# Patient Record
Sex: FEMALE | Race: WHITE | NOT HISPANIC OR LATINO | Employment: OTHER | URBAN - NONMETROPOLITAN AREA
[De-identification: names, ages, dates, MRNs, and addresses within clinical notes are randomized per-mention and may not be internally consistent; named-entity substitution may affect disease eponyms.]

---

## 2018-10-22 ENCOUNTER — HISTORICAL (OUTPATIENT)
Dept: ADMINISTRATIVE | Facility: HOSPITAL | Age: 83
End: 2018-10-22

## 2018-10-26 ENCOUNTER — HISTORICAL (OUTPATIENT)
Dept: ADMINISTRATIVE | Facility: HOSPITAL | Age: 83
End: 2018-10-26

## 2018-10-30 ENCOUNTER — HISTORICAL (OUTPATIENT)
Dept: ADMINISTRATIVE | Facility: HOSPITAL | Age: 83
End: 2018-10-30

## 2022-08-02 ENCOUNTER — HISTORICAL (OUTPATIENT)
Dept: ADMINISTRATIVE | Facility: HOSPITAL | Age: 87
End: 2022-08-02

## 2023-07-16 ENCOUNTER — HOSPITAL ENCOUNTER (EMERGENCY)
Facility: HOSPITAL | Age: 88
Discharge: HOME | End: 2023-07-16
Payer: MEDICARE

## 2023-07-16 VITALS
SYSTOLIC BLOOD PRESSURE: 137 MMHG | BODY MASS INDEX: 17.72 KG/M2 | TEMPERATURE: 98 F | OXYGEN SATURATION: 95 % | WEIGHT: 100 LBS | RESPIRATION RATE: 18 BRPM | DIASTOLIC BLOOD PRESSURE: 70 MMHG | HEART RATE: 70 BPM | HEIGHT: 63 IN

## 2023-07-16 DIAGNOSIS — L03.115 CELLULITIS OF RIGHT LEG: Primary | ICD-10-CM

## 2023-07-16 LAB
ALBUMIN SERPL-MCNC: 3.9 G/DL (ref 3.4–5)
ALBUMIN/GLOB SERPL: 1.9 RATIO
ALP SERPL-CCNC: 68 UNIT/L (ref 50–144)
ALT SERPL-CCNC: 14 UNIT/L (ref 1–45)
ANION GAP SERPL CALC-SCNC: 1 MEQ/L (ref 2–13)
AST SERPL-CCNC: 20 UNIT/L (ref 14–36)
BASOPHILS # BLD AUTO: 0.04 X10(3)/MCL (ref 0.01–0.08)
BASOPHILS NFR BLD AUTO: 0.7 % (ref 0.1–1.2)
BILIRUBIN DIRECT+TOT PNL SERPL-MCNC: 0.5 MG/DL (ref 0–1)
BUN SERPL-MCNC: 16 MG/DL (ref 7–20)
CALCIUM SERPL-MCNC: 9.5 MG/DL (ref 8.4–10.2)
CHLORIDE SERPL-SCNC: 105 MMOL/L (ref 98–110)
CO2 SERPL-SCNC: 35 MMOL/L (ref 21–32)
CREAT SERPL-MCNC: 0.71 MG/DL (ref 0.66–1.25)
CREAT/UREA NIT SERPL: 23 (ref 12–20)
EOSINOPHIL # BLD AUTO: 0.06 X10(3)/MCL (ref 0.04–0.36)
EOSINOPHIL NFR BLD AUTO: 1.1 % (ref 0.7–7)
ERYTHROCYTE [DISTWIDTH] IN BLOOD BY AUTOMATED COUNT: 15.1 % (ref 11–14.5)
GFR SERPLBLD CREATININE-BSD FMLA CKD-EPI: 81 MLS/MIN/1.73/M2
GLOBULIN SER-MCNC: 2.1 GM/DL (ref 2–3.9)
GLUCOSE SERPL-MCNC: 97 MG/DL (ref 70–115)
HCT VFR BLD AUTO: 32.5 % (ref 36–48)
HGB BLD-MCNC: 10.3 G/DL (ref 11.8–16)
IMM GRANULOCYTES # BLD AUTO: 0.02 X10(3)/MCL (ref 0–0.03)
IMM GRANULOCYTES NFR BLD AUTO: 0.4 % (ref 0–0.5)
LYMPHOCYTES # BLD AUTO: 0.79 X10(3)/MCL (ref 1.16–3.74)
LYMPHOCYTES NFR BLD AUTO: 14.2 % (ref 20–55)
MCH RBC QN AUTO: 30.1 PG (ref 27–34)
MCHC RBC AUTO-ENTMCNC: 31.7 G/DL (ref 31–37)
MCV RBC AUTO: 95 FL (ref 79–99)
MONOCYTES # BLD AUTO: 0.38 X10(3)/MCL (ref 0.24–0.36)
MONOCYTES NFR BLD AUTO: 6.8 % (ref 4.7–12.5)
NEUTROPHILS # BLD AUTO: 4.29 X10(3)/MCL (ref 1.56–6.13)
NEUTROPHILS NFR BLD AUTO: 76.8 % (ref 37–73)
NRBC BLD AUTO-RTO: 0 %
PLATELET # BLD AUTO: 400 X10(3)/MCL (ref 140–371)
PMV BLD AUTO: 8.7 FL (ref 9.4–12.4)
POTASSIUM SERPL-SCNC: 4.3 MMOL/L (ref 3.5–5.1)
PROT SERPL-MCNC: 6 GM/DL (ref 6.3–8.2)
RBC # BLD AUTO: 3.42 X10(6)/MCL (ref 4–5.1)
SODIUM SERPL-SCNC: 141 MMOL/L (ref 135–145)
WBC # SPEC AUTO: 5.58 X10(3)/MCL (ref 4–11.5)

## 2023-07-16 PROCEDURE — 36415 COLL VENOUS BLD VENIPUNCTURE: CPT | Performed by: NURSE PRACTITIONER

## 2023-07-16 PROCEDURE — 85025 COMPLETE CBC W/AUTO DIFF WBC: CPT | Performed by: NURSE PRACTITIONER

## 2023-07-16 PROCEDURE — 99283 EMERGENCY DEPT VISIT LOW MDM: CPT

## 2023-07-16 PROCEDURE — 80053 COMPREHEN METABOLIC PANEL: CPT | Performed by: NURSE PRACTITIONER

## 2023-07-16 RX ORDER — CEPHALEXIN 500 MG/1
500 CAPSULE ORAL 4 TIMES DAILY
Qty: 20 CAPSULE | Refills: 0 | Status: SHIPPED | OUTPATIENT
Start: 2023-07-16 | End: 2023-07-21

## 2023-07-16 NOTE — ED PROVIDER NOTES
Encounter Date: 7/16/2023       History     Chief Complaint   Patient presents with    Cellulitis     Pt c/o redness and swelling to right foot/lower leg since yesterday but worse today.       C/O Pt c/o redness and swelling to right foot/lower leg since yesterday but worse today.        Review of patient's allergies indicates:  No Known Allergies  Past Medical History:   Diagnosis Date    COPD (chronic obstructive pulmonary disease)     Femur fracture, right     Paroxysmal atrial fibrillation      Past Surgical History:   Procedure Laterality Date    APPENDECTOMY      CHOLECYSTECTOMY      CORONARY ANGIOPLASTY WITH STENT PLACEMENT      HIATAL HERNIA REPAIR      HYSTERECTOMY      MASTECTOMY Bilateral      No family history on file.  Social History     Tobacco Use    Smoking status: Every Day     Types: Cigarettes    Smokeless tobacco: Never   Substance Use Topics    Alcohol use: Never    Drug use: Never     Review of Systems   Skin:         RT LOWER LEG AND ANKLE REDNESS AND SWELLING   All other systems reviewed and are negative.    Physical Exam     Initial Vitals   BP Pulse Resp Temp SpO2   07/16/23 1505 07/16/23 1505 07/16/23 1505 07/16/23 1519 07/16/23 1505   137/70 70 18 98.2 °F (36.8 °C) 95 %      MAP       --                Physical Exam    Nursing note and vitals reviewed.  Constitutional: She appears well-developed and well-nourished.   HENT:   Head: Normocephalic and atraumatic.   Eyes: EOM are normal. Pupils are equal, round, and reactive to light.   Neck: Neck supple.   Normal range of motion.  Cardiovascular:  Normal rate, regular rhythm and normal heart sounds.           Pulmonary/Chest: Breath sounds normal.   Abdominal: Abdomen is soft. Bowel sounds are normal.   Musculoskeletal:         General: Normal range of motion.      Cervical back: Normal range of motion and neck supple.     Neurological: She is alert and oriented to person, place, and time.   Skin: Skin is warm and dry. Capillary refill takes  less than 2 seconds.   RT LOWER LEG ERYTHEMA, WARMTH  AND SWELLING   Psychiatric: She has a normal mood and affect. Her behavior is normal. Judgment and thought content normal.       ED Course   Procedures  Labs Reviewed   COMPREHENSIVE METABOLIC PANEL - Abnormal; Notable for the following components:       Result Value    Carbon Dioxide 35 (*)     Protein Total 6.0 (*)     Anion Gap 1.0 (*)     BUN/Creatinine Ratio 23 (*)     All other components within normal limits   CBC WITH DIFFERENTIAL - Abnormal; Notable for the following components:    RBC 3.42 (*)     Hgb 10.3 (*)     Hct 32.5 (*)     RDW 15.1 (*)     Platelet 400 (*)     MPV 8.7 (*)     Neut % 76.8 (*)     Lymph % 14.2 (*)     Lymph # 0.79 (*)     Mono # 0.38 (*)     All other components within normal limits   CBC W/ AUTO DIFFERENTIAL    Narrative:     The following orders were created for panel order CBC auto differential.  Procedure                               Abnormality         Status                     ---------                               -----------         ------                     CBC with Differential[[id number]]        Abnormal            Final result                 Please view results for these tests on the individual orders.          Imaging Results    None          Medications - No data to display  Medical Decision Making:   Initial Assessment:   Rt lower leg redness, warmth and swelling, rhonchi throughout lung fields r/t chronic copd, otherwise negative exam  Differential Diagnosis:   Allergic reaction, insect bites  Clinical Tests:   Lab Tests: Ordered and Reviewed  The following lab test(s) were unremarkable: CBC and CMP  ED Management:  Prescriptions provided at discharge           ED Course as of 07/16/23 1607   Sun Jul 16, 2023   1540 Comprehensive metabolic panel []      ED Course User Index  [] JOSE LUIS Correa                 Clinical Impression:   Final diagnoses:  [L03.115] Cellulitis of right leg (Primary)        ED  Disposition Condition    Discharge Stable          ED Prescriptions       Medication Sig Dispense Start Date End Date Auth. Provider    cephALEXin (KEFLEX) 500 MG capsule Take 1 capsule (500 mg total) by mouth 4 (four) times daily. for 5 days 20 capsule 7/16/2023 7/21/2023 JOSE LUIS Correa          Follow-up Information       Follow up With Specialties Details Why Contact Info    Sj Paige MD Internal Medicine  As needed [address] [facility] [id number] [phone]               JOSE LUIS Correa  07/16/23 8256

## 2023-10-17 ENCOUNTER — TELEPHONE (OUTPATIENT)
Dept: FAMILY MEDICINE | Facility: CLINIC | Age: 88
End: 2023-10-17
Payer: MEDICARE

## 2024-09-05 ENCOUNTER — LAB REQUISITION (OUTPATIENT)
Dept: LAB | Facility: HOSPITAL | Age: 89
End: 2024-09-05
Payer: MEDICARE

## 2024-09-05 DIAGNOSIS — I50.9 HEART FAILURE, UNSPECIFIED: ICD-10-CM

## 2024-09-05 DIAGNOSIS — I25.10 ATHEROSCLEROTIC HEART DISEASE OF NATIVE CORONARY ARTERY WITHOUT ANGINA PECTORIS: ICD-10-CM

## 2024-09-05 DIAGNOSIS — E55.9 VITAMIN D DEFICIENCY, UNSPECIFIED: ICD-10-CM

## 2024-09-05 DIAGNOSIS — E11.9 TYPE 2 DIABETES MELLITUS WITHOUT COMPLICATIONS: ICD-10-CM

## 2024-09-05 DIAGNOSIS — E78.5 HYPERLIPIDEMIA, UNSPECIFIED: ICD-10-CM

## 2024-09-05 DIAGNOSIS — I11.0 HYPERTENSIVE HEART DISEASE WITH HEART FAILURE: ICD-10-CM

## 2024-09-05 DIAGNOSIS — E03.9 HYPOTHYROIDISM, UNSPECIFIED: ICD-10-CM

## 2024-09-05 LAB
ALBUMIN SERPL-MCNC: 4 G/DL (ref 3.4–5)
ALBUMIN/GLOB SERPL: 1.7 RATIO
ALP SERPL-CCNC: 70 UNIT/L (ref 50–144)
ALT SERPL-CCNC: 12 UNIT/L (ref 1–45)
ANION GAP SERPL CALC-SCNC: 5 MEQ/L (ref 2–13)
AST SERPL-CCNC: 19 UNIT/L (ref 14–36)
BASOPHILS # BLD AUTO: 0.03 X10(3)/MCL (ref 0.01–0.08)
BASOPHILS NFR BLD AUTO: 0.5 % (ref 0.1–1.2)
BILIRUB SERPL-MCNC: 0.3 MG/DL (ref 0–1)
BILIRUB UR QL STRIP.AUTO: NEGATIVE
BUN SERPL-MCNC: 30 MG/DL (ref 7–20)
CALCIUM SERPL-MCNC: 10.4 MG/DL (ref 8.4–10.2)
CHLORIDE SERPL-SCNC: 102 MMOL/L (ref 98–110)
CHOLEST SERPL-MCNC: 141 MG/DL (ref 0–200)
CLARITY UR: CLEAR
CO2 SERPL-SCNC: 29 MMOL/L (ref 21–32)
COLOR UR AUTO: YELLOW
CREAT SERPL-MCNC: 0.65 MG/DL (ref 0.66–1.25)
CREAT/UREA NIT SERPL: 46 (ref 12–20)
EOSINOPHIL # BLD AUTO: 0.06 X10(3)/MCL (ref 0.04–0.36)
EOSINOPHIL NFR BLD AUTO: 1.1 % (ref 0.7–7)
ERYTHROCYTE [DISTWIDTH] IN BLOOD BY AUTOMATED COUNT: 13.9 % (ref 11–14.5)
GFR SERPLBLD CREATININE-BSD FMLA CKD-EPI: 84 ML/MIN/1.73/M2
GLOBULIN SER-MCNC: 2.3 GM/DL (ref 2–3.9)
GLUCOSE SERPL-MCNC: 82 MG/DL (ref 70–115)
GLUCOSE UR QL STRIP: NEGATIVE
HCT VFR BLD AUTO: 36.5 % (ref 36–48)
HDLC SERPL-MCNC: 62 MG/DL (ref 40–60)
HGB BLD-MCNC: 11.5 G/DL (ref 11.8–16)
HGB UR QL STRIP: NEGATIVE
IMM GRANULOCYTES # BLD AUTO: 0.01 X10(3)/MCL (ref 0–0.03)
IMM GRANULOCYTES NFR BLD AUTO: 0.2 % (ref 0–0.5)
KETONES UR QL STRIP: NEGATIVE
LDLC SERPL DIRECT ASSAY-SCNC: 66.3 MG/DL (ref 30–100)
LEUKOCYTE ESTERASE UR QL STRIP: NEGATIVE
LYMPHOCYTES # BLD AUTO: 0.72 X10(3)/MCL (ref 1.16–3.74)
LYMPHOCYTES NFR BLD AUTO: 13.2 % (ref 20–55)
MCH RBC QN AUTO: 27.3 PG (ref 27–34)
MCHC RBC AUTO-ENTMCNC: 31.5 G/DL (ref 31–37)
MCV RBC AUTO: 86.7 FL (ref 79–99)
MONOCYTES # BLD AUTO: 0.45 X10(3)/MCL (ref 0.24–0.36)
MONOCYTES NFR BLD AUTO: 8.2 % (ref 4.7–12.5)
NEUTROPHILS # BLD AUTO: 4.19 X10(3)/MCL (ref 1.56–6.13)
NEUTROPHILS NFR BLD AUTO: 76.8 % (ref 37–73)
NITRITE UR QL STRIP: NEGATIVE
NRBC BLD AUTO-RTO: 0 %
PH UR STRIP: 6 [PH]
PLATELET # BLD AUTO: 333 X10(3)/MCL (ref 140–371)
PMV BLD AUTO: 10 FL (ref 9.4–12.4)
POTASSIUM SERPL-SCNC: 4.3 MMOL/L (ref 3.5–5.1)
PROT SERPL-MCNC: 6.3 GM/DL (ref 6.3–8.2)
PROT UR QL STRIP: NEGATIVE
RBC # BLD AUTO: 4.21 X10(6)/MCL (ref 4–5.1)
SODIUM SERPL-SCNC: 136 MMOL/L (ref 136–145)
SP GR UR STRIP.AUTO: 1.02 (ref 1–1.03)
T4 FREE SERPL-MCNC: 1.23 NG/DL (ref 0.78–2.19)
TRIGL SERPL-MCNC: 64 MG/DL (ref 30–200)
UROBILINOGEN UR STRIP-ACNC: 0.2
WBC # BLD AUTO: 5.46 X10(3)/MCL (ref 4–11.5)

## 2024-09-05 PROCEDURE — 84439 ASSAY OF FREE THYROXINE: CPT | Performed by: INTERNAL MEDICINE

## 2024-09-05 PROCEDURE — 82652 VIT D 1 25-DIHYDROXY: CPT | Performed by: INTERNAL MEDICINE

## 2024-09-05 PROCEDURE — 80053 COMPREHEN METABOLIC PANEL: CPT | Performed by: INTERNAL MEDICINE

## 2024-09-05 PROCEDURE — 80061 LIPID PANEL: CPT | Performed by: INTERNAL MEDICINE

## 2024-09-05 PROCEDURE — 81003 URINALYSIS AUTO W/O SCOPE: CPT | Performed by: INTERNAL MEDICINE

## 2024-09-05 PROCEDURE — 85025 COMPLETE CBC W/AUTO DIFF WBC: CPT | Performed by: INTERNAL MEDICINE

## 2024-09-11 LAB — 1,25(OH)2D SERPL-MCNC: 29 PG/ML (ref 18–78)

## 2024-12-13 ENCOUNTER — HOSPITAL ENCOUNTER (OUTPATIENT)
Facility: HOSPITAL | Age: 89
Discharge: NURSING, REHAB OR LONG-TERM CARE | End: 2024-12-16
Attending: FAMILY MEDICINE | Admitting: FAMILY MEDICINE
Payer: MEDICARE

## 2024-12-13 DIAGNOSIS — M17.12 OSTEOARTHRITIS OF LEFT KNEE, UNSPECIFIED OSTEOARTHRITIS TYPE: Primary | ICD-10-CM

## 2024-12-13 DIAGNOSIS — Z74.09 IMPAIRED MOBILITY AND ADLS: ICD-10-CM

## 2024-12-13 DIAGNOSIS — I48.91 A-FIB: ICD-10-CM

## 2024-12-13 DIAGNOSIS — M25.50 JOINT PAIN: ICD-10-CM

## 2024-12-13 DIAGNOSIS — Z78.9 IMPAIRED MOBILITY AND ADLS: ICD-10-CM

## 2024-12-13 LAB
ALBUMIN SERPL-MCNC: 4 G/DL (ref 3.4–5)
ALBUMIN/GLOB SERPL: 2 RATIO
ALP SERPL-CCNC: 86 UNIT/L (ref 50–144)
ALT SERPL-CCNC: 9 UNIT/L (ref 1–45)
ANION GAP SERPL CALC-SCNC: 6 MEQ/L (ref 2–13)
AST SERPL-CCNC: 14 UNIT/L (ref 14–36)
BASOPHILS # BLD AUTO: 0.03 X10(3)/MCL (ref 0.01–0.08)
BASOPHILS NFR BLD AUTO: 0.4 % (ref 0.1–1.2)
BILIRUB SERPL-MCNC: 0.6 MG/DL (ref 0–1)
BUN SERPL-MCNC: 23 MG/DL (ref 7–20)
CALCIUM SERPL-MCNC: 10 MG/DL (ref 8.4–10.2)
CHLORIDE SERPL-SCNC: 102 MMOL/L (ref 98–110)
CO2 SERPL-SCNC: 30 MMOL/L (ref 21–32)
CREAT SERPL-MCNC: 0.62 MG/DL (ref 0.66–1.25)
CREAT/UREA NIT SERPL: 37 (ref 12–20)
EOSINOPHIL # BLD AUTO: 0.02 X10(3)/MCL (ref 0.04–0.36)
EOSINOPHIL NFR BLD AUTO: 0.2 % (ref 0.7–7)
ERYTHROCYTE [DISTWIDTH] IN BLOOD BY AUTOMATED COUNT: 13 % (ref 11–14.5)
ERYTHROCYTE [SEDIMENTATION RATE] IN BLOOD: 15 MM/HR (ref 0–20)
GFR SERPLBLD CREATININE-BSD FMLA CKD-EPI: 85 ML/MIN/1.73/M2
GLOBULIN SER-MCNC: 2 GM/DL (ref 2–3.9)
GLUCOSE SERPL-MCNC: 84 MG/DL (ref 70–115)
HCT VFR BLD AUTO: 35 % (ref 36–48)
HGB BLD-MCNC: 11.1 G/DL (ref 11.8–16)
IMM GRANULOCYTES # BLD AUTO: 0.02 X10(3)/MCL (ref 0–0.03)
IMM GRANULOCYTES NFR BLD AUTO: 0.2 % (ref 0–0.5)
LYMPHOCYTES # BLD AUTO: 0.5 X10(3)/MCL (ref 1.16–3.74)
LYMPHOCYTES NFR BLD AUTO: 6 % (ref 20–55)
MCH RBC QN AUTO: 28.1 PG (ref 27–34)
MCHC RBC AUTO-ENTMCNC: 31.7 G/DL (ref 31–37)
MCV RBC AUTO: 88.6 FL (ref 79–99)
MONOCYTES # BLD AUTO: 0.78 X10(3)/MCL (ref 0.24–0.36)
MONOCYTES NFR BLD AUTO: 9.4 % (ref 4.7–12.5)
NEUTROPHILS # BLD AUTO: 6.95 X10(3)/MCL (ref 1.56–6.13)
NEUTROPHILS NFR BLD AUTO: 83.8 % (ref 37–73)
NRBC BLD AUTO-RTO: 0 %
OHS QRS DURATION: 144 MS
OHS QTC CALCULATION: 454 MS
PLATELET # BLD AUTO: 313 X10(3)/MCL (ref 140–371)
PMV BLD AUTO: 9.5 FL (ref 9.4–12.4)
POTASSIUM SERPL-SCNC: 3.9 MMOL/L (ref 3.5–5.1)
PROT SERPL-MCNC: 6 GM/DL (ref 6.3–8.2)
RBC # BLD AUTO: 3.95 X10(6)/MCL (ref 4–5.1)
SODIUM SERPL-SCNC: 138 MMOL/L (ref 136–145)
WBC # BLD AUTO: 8.3 X10(3)/MCL (ref 4–11.5)

## 2024-12-13 PROCEDURE — 96372 THER/PROPH/DIAG INJ SC/IM: CPT | Performed by: FAMILY MEDICINE

## 2024-12-13 PROCEDURE — 94761 N-INVAS EAR/PLS OXIMETRY MLT: CPT

## 2024-12-13 PROCEDURE — 97162 PT EVAL MOD COMPLEX 30 MIN: CPT

## 2024-12-13 PROCEDURE — 85025 COMPLETE CBC W/AUTO DIFF WBC: CPT | Performed by: FAMILY MEDICINE

## 2024-12-13 PROCEDURE — 63600175 PHARM REV CODE 636 W HCPCS: Performed by: FAMILY MEDICINE

## 2024-12-13 PROCEDURE — 80053 COMPREHEN METABOLIC PANEL: CPT | Performed by: FAMILY MEDICINE

## 2024-12-13 PROCEDURE — 93005 ELECTROCARDIOGRAM TRACING: CPT

## 2024-12-13 PROCEDURE — G0378 HOSPITAL OBSERVATION PER HR: HCPCS

## 2024-12-13 PROCEDURE — 99285 EMERGENCY DEPT VISIT HI MDM: CPT | Mod: 25

## 2024-12-13 PROCEDURE — 85652 RBC SED RATE AUTOMATED: CPT | Performed by: FAMILY MEDICINE

## 2024-12-13 PROCEDURE — 93010 ELECTROCARDIOGRAM REPORT: CPT | Mod: ,,, | Performed by: INTERNAL MEDICINE

## 2024-12-13 PROCEDURE — 25000003 PHARM REV CODE 250: Performed by: FAMILY MEDICINE

## 2024-12-13 RX ORDER — POTASSIUM CHLORIDE 20 MEQ/1
20 TABLET, EXTENDED RELEASE ORAL DAILY
Status: DISCONTINUED | OUTPATIENT
Start: 2024-12-13 | End: 2024-12-16 | Stop reason: HOSPADM

## 2024-12-13 RX ORDER — FLECAINIDE ACETATE 50 MG/1
50 TABLET ORAL EVERY 12 HOURS
COMMUNITY

## 2024-12-13 RX ORDER — ISOSORBIDE MONONITRATE 30 MG/1
30 TABLET, EXTENDED RELEASE ORAL DAILY
Status: DISCONTINUED | OUTPATIENT
Start: 2024-12-13 | End: 2024-12-16 | Stop reason: HOSPADM

## 2024-12-13 RX ORDER — POTASSIUM CHLORIDE 20 MEQ/1
20 TABLET, EXTENDED RELEASE ORAL DAILY
COMMUNITY

## 2024-12-13 RX ORDER — CLOPIDOGREL BISULFATE 75 MG/1
75 TABLET ORAL DAILY
Status: DISCONTINUED | OUTPATIENT
Start: 2024-12-13 | End: 2024-12-16 | Stop reason: HOSPADM

## 2024-12-13 RX ORDER — ASPIRIN 81 MG/1
81 TABLET ORAL DAILY
COMMUNITY

## 2024-12-13 RX ORDER — HYDROCODONE BITARTRATE AND ACETAMINOPHEN 7.5; 325 MG/1; MG/1
1 TABLET ORAL EVERY 6 HOURS PRN
Status: DISCONTINUED | OUTPATIENT
Start: 2024-12-13 | End: 2024-12-16 | Stop reason: HOSPADM

## 2024-12-13 RX ORDER — PANTOPRAZOLE SODIUM 40 MG/1
40 TABLET, DELAYED RELEASE ORAL DAILY
COMMUNITY

## 2024-12-13 RX ORDER — FUROSEMIDE 20 MG/1
20 TABLET ORAL 2 TIMES DAILY
COMMUNITY

## 2024-12-13 RX ORDER — CLOPIDOGREL BISULFATE 75 MG/1
75 TABLET ORAL DAILY
COMMUNITY

## 2024-12-13 RX ORDER — DEXAMETHASONE SODIUM PHOSPHATE 4 MG/ML
8 INJECTION, SOLUTION INTRA-ARTICULAR; INTRALESIONAL; INTRAMUSCULAR; INTRAVENOUS; SOFT TISSUE
Status: COMPLETED | OUTPATIENT
Start: 2024-12-13 | End: 2024-12-13

## 2024-12-13 RX ORDER — MORPHINE SULFATE 4 MG/ML
3 INJECTION, SOLUTION INTRAMUSCULAR; INTRAVENOUS EVERY 6 HOURS PRN
Status: DISCONTINUED | OUTPATIENT
Start: 2024-12-13 | End: 2024-12-16 | Stop reason: HOSPADM

## 2024-12-13 RX ORDER — ONDANSETRON HYDROCHLORIDE 2 MG/ML
4 INJECTION, SOLUTION INTRAVENOUS EVERY 6 HOURS PRN
Status: DISCONTINUED | OUTPATIENT
Start: 2024-12-13 | End: 2024-12-16 | Stop reason: HOSPADM

## 2024-12-13 RX ORDER — ASPIRIN 81 MG/1
81 TABLET ORAL DAILY
Status: DISCONTINUED | OUTPATIENT
Start: 2024-12-13 | End: 2024-12-16 | Stop reason: HOSPADM

## 2024-12-13 RX ORDER — ISOSORBIDE MONONITRATE 30 MG/1
30 TABLET, EXTENDED RELEASE ORAL DAILY
COMMUNITY

## 2024-12-13 RX ORDER — TALC
6 POWDER (GRAM) TOPICAL NIGHTLY PRN
Status: DISCONTINUED | OUTPATIENT
Start: 2024-12-13 | End: 2024-12-16 | Stop reason: HOSPADM

## 2024-12-13 RX ORDER — ENOXAPARIN SODIUM 100 MG/ML
40 INJECTION SUBCUTANEOUS EVERY 24 HOURS
Status: DISCONTINUED | OUTPATIENT
Start: 2024-12-13 | End: 2024-12-15

## 2024-12-13 RX ORDER — PANTOPRAZOLE SODIUM 40 MG/1
40 TABLET, DELAYED RELEASE ORAL DAILY
Status: DISCONTINUED | OUTPATIENT
Start: 2024-12-13 | End: 2024-12-16 | Stop reason: HOSPADM

## 2024-12-13 RX ORDER — SODIUM CHLORIDE 0.9 % (FLUSH) 0.9 %
3 SYRINGE (ML) INJECTION EVERY 6 HOURS PRN
Status: DISCONTINUED | OUTPATIENT
Start: 2024-12-13 | End: 2024-12-16 | Stop reason: HOSPADM

## 2024-12-13 RX ORDER — FUROSEMIDE 20 MG/1
20 TABLET ORAL 2 TIMES DAILY
Status: DISCONTINUED | OUTPATIENT
Start: 2024-12-13 | End: 2024-12-16 | Stop reason: HOSPADM

## 2024-12-13 RX ORDER — KETOROLAC TROMETHAMINE 30 MG/ML
60 INJECTION, SOLUTION INTRAMUSCULAR; INTRAVENOUS
Status: COMPLETED | OUTPATIENT
Start: 2024-12-13 | End: 2024-12-13

## 2024-12-13 RX ORDER — FLECAINIDE ACETATE 50 MG/1
50 TABLET ORAL EVERY 12 HOURS
Status: DISCONTINUED | OUTPATIENT
Start: 2024-12-13 | End: 2024-12-16 | Stop reason: HOSPADM

## 2024-12-13 RX ORDER — ACETAMINOPHEN 500 MG
1000 TABLET ORAL EVERY 6 HOURS PRN
Status: DISCONTINUED | OUTPATIENT
Start: 2024-12-13 | End: 2024-12-16 | Stop reason: HOSPADM

## 2024-12-13 RX ADMIN — PANTOPRAZOLE SODIUM 40 MG: 40 TABLET, DELAYED RELEASE ORAL at 01:12

## 2024-12-13 RX ADMIN — ENOXAPARIN SODIUM 40 MG: 40 INJECTION SUBCUTANEOUS at 05:12

## 2024-12-13 RX ADMIN — FLECAINIDE ACETATE 50 MG: 50 TABLET ORAL at 08:12

## 2024-12-13 RX ADMIN — HYDROCODONE BITARTRATE AND ACETAMINOPHEN 1 TABLET: 7.5; 325 TABLET ORAL at 08:12

## 2024-12-13 RX ADMIN — ASPIRIN 81 MG: 81 TABLET, COATED ORAL at 01:12

## 2024-12-13 RX ADMIN — KETOROLAC TROMETHAMINE 60 MG: 30 INJECTION, SOLUTION INTRAMUSCULAR at 06:12

## 2024-12-13 RX ADMIN — DEXAMETHASONE SODIUM PHOSPHATE 8 MG: 4 INJECTION, SOLUTION INTRA-ARTICULAR; INTRALESIONAL; INTRAMUSCULAR; INTRAVENOUS; SOFT TISSUE at 06:12

## 2024-12-13 RX ADMIN — POTASSIUM CHLORIDE 20 MEQ: 1500 TABLET, EXTENDED RELEASE ORAL at 01:12

## 2024-12-13 RX ADMIN — FUROSEMIDE 20 MG: 20 TABLET ORAL at 08:12

## 2024-12-13 RX ADMIN — FLECAINIDE ACETATE 50 MG: 50 TABLET ORAL at 01:12

## 2024-12-13 RX ADMIN — FUROSEMIDE 20 MG: 20 TABLET ORAL at 01:12

## 2024-12-13 RX ADMIN — ISOSORBIDE MONONITRATE 30 MG: 30 TABLET, EXTENDED RELEASE ORAL at 01:12

## 2024-12-13 RX ADMIN — CLOPIDOGREL BISULFATE 75 MG: 75 TABLET ORAL at 01:12

## 2024-12-13 NOTE — PT/OT/SLP EVAL
Physical Therapy Evaluation    Patient Name:  Stephanie Gutierrez   MRN:  [id number]    Recommendations:     Discharge Recommendations:  (Rehab vs Home Health PT, pending medical progress)   Discharge Equipment Recommendations:     Barriers to discharge:  current medical status    Assessment:     Stephanie Gutierrez is a 89 y.o. female admitted with a medical diagnosis of <principal problem not specified>.  She presents with the following impairments/functional limitations: pain, gait instability, impaired functional mobility, weakness     Patient found with HOB elevated. She is agreeable to PT evaluation. Patient performed supine to sit, min A. Sit to stand completed, min A with SW. Patient maintained standing balance for approximately 1 minute before needing to sit down d/t fatigue. Following seated rest break, patient completed a second sit to stand, min A with RW, and completed 4 steps to left side. Patient returned to bed and left with HOB elevated and all needs met. Cryotherapy applied to L knee to reduce edema and pain. Nurse notified.    Rehab Prognosis: Good; patient would benefit from acute skilled PT services to address these deficits and reach maximum level of function.    Recent Surgery: * No surgery found *      Plan:     During this hospitalization, patient to be seen 5 x/week (5-6x/week, 1-2x/day) to address the identified rehab impairments via gait training, therapeutic activities, therapeutic exercises and progress toward the following goals:    Plan of Care Expires:  01/13/25    Subjective     Chief Complaint: L knee pain  Patient/Family Comments/goals: to go home  Pain/Comfort:       Patients cultural, spiritual, Sabianist conflicts given the current situation:      Living Environment:  Home alone  Prior to admission, patients level of function was independent with AD.  Equipment used at home: walker, standard, nebulizer.  DME owned (not currently used): none.  Upon discharge, patient will have assistance  from family.    Objective:     Communicated with nursing prior to session.  Patient found HOB elevated with bed alarm, telemetry, peripheral IV, PureWick, knee immobilizer  upon PT entry to room.    General Precautions: Standard, fall  Orthopedic Precautions:    Braces:    Respiratory Status: Room air    Exams:  RLE ROM: WFL  RLE Strength: WFL  LLE ROM: limited d/t pain  LLE Strength: 3+/5 d/t pain    Functional Mobility:  Bed Mobility:     Supine to Sit: minimum assistance  Sit to Supine: minimum assistance  Transfers:     Sit to Stand:  minimum assistance with standard walker      AM-PAC 6 CLICK MOBILITY  Total Score:        Treatment & Education:  See above    Patient left HOB elevated with all lines intact, call button in reach, bed alarm on, nurse notified, and family present.    GOALS:   Multidisciplinary Problems       Physical Therapy Goals          Problem: Physical Therapy    Goal Priority Disciplines Outcome Interventions   Physical Therapy Goal     PT, PT/OT Progressing    Description: Goals to be met by: discharge     Patient will increase functional independence with mobility by performin. Sit to stand transfer with Contact Guard Assistance  2. Bed to chair transfer with Contact Guard Assistance using Standard Walker  3. Gait  x 15 feet with Contact Guard Assistance using Standard Walker.                          History:     Past Medical History:   Diagnosis Date    COPD (chronic obstructive pulmonary disease)     Femur fracture, right     Paroxysmal atrial fibrillation        Past Surgical History:   Procedure Laterality Date    APPENDECTOMY      CHOLECYSTECTOMY      CORONARY ANGIOPLASTY WITH STENT PLACEMENT      HIATAL HERNIA REPAIR      HYSTERECTOMY      MASTECTOMY Bilateral        Time Tracking:     PT Received On: 24  PT Start Time: 1410     PT Stop Time: 1440  PT Total Time (min): 30 min     Billable Minutes: Evaluation 30      2024

## 2024-12-13 NOTE — ED PROVIDER NOTES
Encounter Date: 12/13/2024       History     Chief Complaint   Patient presents with    Knee Pain     Arrives via EMS [facility] from home with c/o (L) knee pain and swelling onset x couple of days.      Patient presents with atraumatic left knee pain and swelling.  No calf pain or swelling.  Onset times couple of days.  No fevers chills sweats.  She lives alone.  She states that the pain is such that she can no longer self care for herself.  She can not bathe toilet transfer.  She has no support instructed that she can utilize at the house either with the family or other resources.        Review of patient's allergies indicates:  No Known Allergies  Past Medical History:   Diagnosis Date    COPD (chronic obstructive pulmonary disease)     Femur fracture, right     Paroxysmal atrial fibrillation      Past Surgical History:   Procedure Laterality Date    APPENDECTOMY      CHOLECYSTECTOMY      CORONARY ANGIOPLASTY WITH STENT PLACEMENT      HIATAL HERNIA REPAIR      HYSTERECTOMY      MASTECTOMY Bilateral      No family history on file.  Social History     Tobacco Use    Smoking status: Every Day     Types: Cigarettes    Smokeless tobacco: Never   Substance Use Topics    Alcohol use: Never    Drug use: Never     Review of Systems   Constitutional: Negative.    Respiratory: Negative.     Cardiovascular: Negative.    Musculoskeletal:  Positive for arthralgias, gait problem and joint swelling.       Physical Exam     Initial Vitals [12/13/24 0510]   BP Pulse Resp Temp SpO2   (!) 148/52 68 18 98.3 °F (36.8 °C) 98 %      MAP       --         Physical Exam    Constitutional: She appears well-developed and well-nourished.   Cardiovascular:  Normal rate, regular rhythm and normal heart sounds.           Pulmonary/Chest: Breath sounds normal.   Abdominal: Abdomen is soft. Bowel sounds are normal.   Musculoskeletal:      Comments: Left knee with moderate to severe swelling, increased warmth, no overlying erythema or fluctuance.      Skin: Skin is warm and dry.         ED Course   Procedures  Labs Reviewed   COMPREHENSIVE METABOLIC PANEL - Abnormal       Result Value    Sodium 138      Potassium 3.9      Chloride 102      CO2 30      Glucose 84      Blood Urea Nitrogen 23 (*)     Creatinine 0.62 (*)     Calcium 10.0      Protein Total 6.0 (*)     Albumin 4.0      Globulin 2.0      Albumin/Globulin Ratio 2.0      Bilirubin Total 0.6      ALP 86      ALT 9      AST 14      eGFR 85      Anion Gap 6.0      BUN/Creatinine Ratio 37 (*)    CBC WITH DIFFERENTIAL - Abnormal    WBC 8.30      RBC 3.95 (*)     Hgb 11.1 (*)     Hct 35.0 (*)     MCV 88.6      MCH 28.1      MCHC 31.7      RDW 13.0      Platelet 313      MPV 9.5      Neut % 83.8 (*)     Lymph % 6.0 (*)     Mono % 9.4      Eos % 0.2 (*)     Basophil % 0.4      Lymph # 0.50 (*)     Neut # 6.95 (*)     Mono # 0.78 (*)     Eos # 0.02 (*)     Baso # 0.03      IG# 0.02      IG% 0.2      NRBC% 0.0     SEDIMENTATION RATE, AUTOMATED - Normal    Sed Rate 15     CBC W/ AUTO DIFFERENTIAL    Narrative:     The following orders were created for panel order CBC auto differential.  Procedure                               Abnormality         Status                     ---------                               -----------         ------                     CBC with Differential[[id number]]        Abnormal            Final result                 Please view results for these tests on the individual orders.     EKG Readings: (Independently Interpreted)   Initial Reading: No STEMI. Rhythm: Normal Sinus Rhythm. Heart Rate: 67. ST Segments: Normal ST Segments. T Waves: Normal. Axis: Right Axis Deviation.       Imaging Results              X-Ray Chest AP Portable (Final result)  Result time 12/13/24 08:16:40      Final result by Saqib Richards MD (12/13/24 08:16:40)                   Impression:      Hyperinflation and cardiomegaly without acute cardiopulmonary abnormality.      Electronically signed by: Saqib Richards  MD  Date:    12/13/2024  Time:    08:16               Narrative:    EXAMINATION:  Single view chest radiograph.    CLINICAL HISTORY:  Unspecified atrial fibrillation    TECHNIQUE:  Single view of the chest.    COMPARISON:  Chest radiograph 08/02/2022.    FINDINGS:  The lungs are hyperinflated without consolidation or effusion.  There is no pneumothorax.  The cardiac silhouette is enlarged.  There is no acute osseous abnormality.                                       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  No displaced fractures identified.      Electronically signed by: Saqib Richards MD  Date:    12/13/2024  Time:    06:50               Narrative:    EXAMINATION:  Three radiographic views of the LEFT KNEE.    CLINICAL HISTORY:  Pain in unspecified joint    TECHNIQUE:  Three radiographic views of the LEFT KNEE.    COMPARISON:  None.    FINDINGS:  Three views of the left knee demonstrate severe tricompartmental osteoarthritis.  No displaced fracture is identified.  There is a large joint effusion.  There is no bony mass lesion.  There is no soft tissue swelling.  There is extensive atherosclerotic calcification.                                       Medications   HYDROcodone-acetaminophen 7.5-325 mg per tablet 1 tablet (1 tablet Oral Given 12/13/24 0813)   ketorolac injection 60 mg (60 mg Intramuscular Given 12/13/24 0607)   dexAMETHasone injection 8 mg (8 mg Intramuscular Given 12/13/24 0608)     Medical Decision Making  Amount and/or Complexity of Data Reviewed  Labs: ordered.  Radiology: ordered.  Discussion of management or test interpretation with external provider(s): Consulted with Dr. Jolley, the patient's orthopedist.  The plan is to admit her to the hospital secondary to her functional incapacitation.  She would then be  transferred to the rehab unit.    Risk  Prescription drug management.      Additional MDM:   Differential Diagnosis:   Osteoarthritis, crystal arthropathy, septic arthritis                                    Clinical Impression:  Final diagnoses:  [M25.50] Joint pain  [I48.91] A-fib  [M17.12] Osteoarthritis of left knee, unspecified osteoarthritis type (Primary)  [Z74.09, Z78.9] Impaired mobility and ADLs          ED Disposition Condition    Observation Stable                Fer Elaine MD  12/13/24 0958       Fer Elaine MD  12/13/24 1009

## 2024-12-13 NOTE — PLAN OF CARE
Problem: Physical Therapy  Goal: Physical Therapy Goal  Description: Goals to be met by: discharge     Patient will increase functional independence with mobility by performin. Sit to stand transfer with Contact Guard Assistance  2. Bed to chair transfer with Contact Guard Assistance using Standard Walker  3. Gait  x 15 feet with Contact Guard Assistance using Standard Walker.     Outcome: Progressing

## 2024-12-13 NOTE — H&P
Hospital Medicine  History & Physical Examination       Patient: Stephanie Gutierrez  MRN: [id number]  STATUS: OP- Observation   DOS: 12/13/2024   PCP: Sj Paige MD      CC: L knee pain and swelling        HISTORY OF PRESENT ILLNESS     89 yo F with atraumatic left knee pain and swelling. No calf pain or swelling. Onset was 2 days ago No fevers chills sweats. She lives alone. She states that the pain is such that she can no longer self care for herself. She can not bathe toilet transfer. Nobody at home to assist her. Xray L knee confirmed severe OA, large effusion. Ortho consulted and will see patient in consult.     REVIEW OF SYSTEMS     Except as documented, all other systems reviewed and negative       PAST MEDICAL HISTORY     Past Medical History:   Diagnosis Date    COPD (chronic obstructive pulmonary disease)     Femur fracture, right     Paroxysmal atrial fibrillation           PAST SURGICAL HISTORY     Past Surgical History:   Procedure Laterality Date    APPENDECTOMY      CHOLECYSTECTOMY      CORONARY ANGIOPLASTY WITH STENT PLACEMENT      HIATAL HERNIA REPAIR      HYSTERECTOMY      MASTECTOMY Bilateral           FAMILY HISTORY     Reviewed, negative in relation to patient's current condition.      SOCIAL HISTORY     Denies alcohol, tobacco or drug abuse    Social History     Tobacco Use    Smoking status: Every Day     Types: Cigarettes    Smokeless tobacco: Never   Substance Use Topics    Alcohol use: Never          ALLERGIES     Patient has no known allergies.      HOME MEDICATIONS     Current Outpatient Medications   Medication Instructions    aspirin (ECOTRIN) 81 mg, Daily    clopidogreL (PLAVIX) 75 mg, Daily    flecainide (TAMBOCOR) 50 mg, Every 12 hours    furosemide (LASIX) 20 mg, 2 times daily    iron,carb/vit C/vit B12/folic (IRON 100 PLUS ORAL) 1 tablet, Daily    isosorbide mononitrate (IMDUR) 30 mg, Daily    pantoprazole (PROTONIX) 40 mg, Daily    potassium chloride SA (K-DUR,KLOR-CON) 20 MEQ  "tablet 20 mEq, Daily    theophylline (CAROLINE-24) 100 mg, Daily          PHYSICAL EXAM   VITALS: T 98 °F (36.7 °C)   BP (!) 146/70   P 64   RR 16   O2 (!) 93 %    GENERAL: Awake and in NAD  HEENT: NC/AT, EOMI, PERRL.  NECK: Supple,  No JVD  LUNGS: CTA B/L  CVS: RRR, S1S2 positive  GI/: Soft, NT/ND, bowel sounds positive.  EXTREMITIES: Peripheral pulses 2+, L knee swollen  DERM: Warm, dry.  No rashes or lesions noted.  NEURO: AAOx3, no focal neurologic deficit  PSYCH: Cooperative, appropriate mood and affect       DIAGNOSTICS     Recent Labs     12/13/24  0536   WBC 8.30   RBC 3.95*   HGB 11.1*   HCT 35.0*   MCV 88.6   MCH 28.1   MCHC 31.7   RDW 13.0        No results for input(s): "LACTIC" in the last 72 hours.  No results for input(s): "INR", "APTT", "D-DIMER" in the last 72 hours.  No results for input(s): "HGBA1C", "CHOL", "TRIG", "LDL", "VLDL", "HDL" in the last 72 hours.   Recent Labs     12/13/24  0536      K 3.9   CO2 30   BUN 23*   CREATININE 0.62*   GLUCOSE 84   CALCIUM 10.0   ALBUMIN 4.0   GLOBULIN 2.0   ALKPHOS 86   ALT 9   AST 14   BILITOT 0.6     No results for input(s): "BNP", "CPK", "TROPONINI" in the last 72 hours.       X-Ray Chest AP Portable  Narrative: EXAMINATION:  Single view chest radiograph.    CLINICAL HISTORY:  Unspecified atrial fibrillation    TECHNIQUE:  Single view of the chest.    COMPARISON:  Chest radiograph 08/02/2022.    FINDINGS:  The lungs are hyperinflated without consolidation or effusion.  There is no pneumothorax.  The cardiac silhouette is enlarged.  There is no acute osseous abnormality.  Impression: Hyperinflation and cardiomegaly without acute cardiopulmonary abnormality.    Electronically signed by: Saqib Richards MD  Date:    12/13/2024  Time:    08:16  X-Ray Knee 3 View Left  Narrative: EXAMINATION:  Three radiographic views of the LEFT KNEE.    CLINICAL HISTORY:  Pain in unspecified joint    TECHNIQUE:  Three radiographic views of the LEFT " KNEE.    COMPARISON:  None.    FINDINGS:  Three views of the left knee demonstrate severe tricompartmental osteoarthritis.  No displaced fracture is identified.  There is a large joint effusion.  There is no bony mass lesion.  There is no soft tissue swelling.  There is extensive atherosclerotic calcification.  Impression: Severe tricompartmental osteoarthritis with a large joint effusion.  No displaced fractures identified.    Electronically signed by: Saqib Richards MD  Date:    12/13/2024  Time:    06:50        ASSESSMENT   Left Knee OA, Severe  Impaired Mobility 2/2 above  COPD  PAF    PLAN     Admit to observation   Pain control  Follow Orthopedic surgery rec's  Order PT  Resume home meds  Case mgmt consult for d/c planning     Prophylaxis: lovenox  Code Status: full       Patient Screened for food insecurity, housing instability, transportation needs, utility difficulties, and interpersonal safety.  No needs identified      Manuel Wahl MD  [facility]

## 2024-12-13 NOTE — PLAN OF CARE
12/13/24 1335   Discharge Assessment   Assessment Type Discharge Planning Assessment   Confirmed/corrected address, phone number and insurance Yes   Confirmed Demographics Correct on Facesheet   Source of Information patient   When was your last doctors appointment? 12/12/24   Communicated DION with patient/caregiver Yes   Reason For Admission Left Knee OA, Severe Impaired Mobility   People in Home alone   Facility Arrived From: Home   Do you expect to return to your current living situation? Yes   Prior to hospitilization cognitive status: Alert/Oriented   Current cognitive status: Alert/Oriented   Walking or Climbing Stairs Difficulty yes   Walking or Climbing Stairs ambulation difficulty, requires equipment   Mobility Management Walker   Dressing/Bathing Difficulty no   Equipment Currently Used at Home walker, standard;nebulizer   Readmission within 30 days? No   Patient currently being followed by outpatient case management? No   Do you currently have service(s) that help you manage your care at home? No   Do you take prescription medications? Yes   Do you have prescription coverage? Yes   Coverage Medicare   Do you have any problems affording any of your prescribed medications? No   Is the patient taking medications as prescribed? yes   Who is going to help you get home at discharge? Rosangelaece   How do you get to doctors appointments? family or friend will provide   Are you on dialysis? No   Do you take coumadin? No   Discharge Plan A Rehab   Discharge Plan B Skilled Nursing Facility   DME Needed Upon Discharge  none   Discharge Plan discussed with: Patient   Transition of Care Barriers Mobility   Physical Activity   On average, how many days per week do you engage in moderate to strenuous exercise (like a brisk walk)? 0 days   On average, how many minutes do you engage in exercise at this level? 0 min   Financial Resource Strain   How hard is it for you to pay for the very basics like food, housing, medical  care, and heating? Not very   Housing Stability   In the last 12 months, was there a time when you were not able to pay the mortgage or rent on time? N   At any time in the past 12 months, were you homeless or living in a shelter (including now)? N   Transportation Needs   Has the lack of transportation kept you from medical appointments, meetings, work or from getting things needed for daily living? No   Food Insecurity   Within the past 12 months, you worried that your food would run out before you got the money to buy more. Never true   Within the past 12 months, the food you bought just didn't last and you didn't have money to get more. Never true   Stress   Do you feel stress - tense, restless, nervous, or anxious, or unable to sleep at night because your mind is troubled all the time - these days? Not at all   Social Isolation   How often do you feel lonely or isolated from those around you?  Never   Alcohol Use   Q1: How often do you have a drink containing alcohol? Monthly or l   Q2: How many drinks containing alcohol do you have on a typical day when you are drinking? 1 or 2   Q3: How often do you have six or more drinks on one occasion? Never   Utilities   In the past 12 months has the electric, gas, oil, or water company threatened to shut off services in your home? No   Health Literacy   How often do you need to have someone help you when you read instructions, pamphlets, or other written material from your doctor or pharmacy? Never     Discharge plan discussed with patient.  Patient will be evaluated by therapy.  Patient would like to return home with home health and physical therapy at discharge but is agreeable to IP Rehab at discharge if necessary.  Patient has used [facility] in the past.  Patient given a CCP list of IP Rehab in area.  Patient chose [facility] and signed CCP accordingly.  Patient given a packet for [facility] to be placed on waiting list because she states that she  will likely end up there in the future when she can no longer live at home alone.

## 2024-12-13 NOTE — PLAN OF CARE
Problem: Adult Inpatient Plan of Care  Goal: Plan of Care Review  Reactivated  Goal: Patient-Specific Goal (Individualized)  Reactivated  Goal: Absence of Hospital-Acquired Illness or Injury  Reactivated  Goal: Optimal Comfort and Wellbeing  Reactivated  Goal: Readiness for Transition of Care  Reactivated     Problem: Fall Injury Risk  Goal: Absence of Fall and Fall-Related Injury  Reactivated     Problem: Wound  Goal: Optimal Coping  Reactivated  Goal: Optimal Functional Ability  Reactivated  Goal: Absence of Infection Signs and Symptoms  Reactivated  Goal: Improved Oral Intake  Reactivated  Goal: Optimal Pain Control and Function  Reactivated  Goal: Skin Health and Integrity  Reactivated  Goal: Optimal Wound Healing  Reactivated

## 2024-12-14 PROCEDURE — 63600175 PHARM REV CODE 636 W HCPCS: Performed by: FAMILY MEDICINE

## 2024-12-14 PROCEDURE — 97110 THERAPEUTIC EXERCISES: CPT

## 2024-12-14 PROCEDURE — 96375 TX/PRO/DX INJ NEW DRUG ADDON: CPT

## 2024-12-14 PROCEDURE — 94761 N-INVAS EAR/PLS OXIMETRY MLT: CPT

## 2024-12-14 PROCEDURE — 96372 THER/PROPH/DIAG INJ SC/IM: CPT | Performed by: FAMILY MEDICINE

## 2024-12-14 PROCEDURE — 96374 THER/PROPH/DIAG INJ IV PUSH: CPT

## 2024-12-14 PROCEDURE — G0378 HOSPITAL OBSERVATION PER HR: HCPCS

## 2024-12-14 PROCEDURE — 25000003 PHARM REV CODE 250: Performed by: FAMILY MEDICINE

## 2024-12-14 PROCEDURE — 99900031 HC PATIENT EDUCATION (STAT)

## 2024-12-14 PROCEDURE — 97530 THERAPEUTIC ACTIVITIES: CPT

## 2024-12-14 RX ADMIN — ASPIRIN 81 MG: 81 TABLET, COATED ORAL at 09:12

## 2024-12-14 RX ADMIN — POTASSIUM CHLORIDE 20 MEQ: 1500 TABLET, EXTENDED RELEASE ORAL at 09:12

## 2024-12-14 RX ADMIN — CLOPIDOGREL BISULFATE 75 MG: 75 TABLET ORAL at 09:12

## 2024-12-14 RX ADMIN — HYDROCODONE BITARTRATE AND ACETAMINOPHEN 1 TABLET: 7.5; 325 TABLET ORAL at 09:12

## 2024-12-14 RX ADMIN — ACETAMINOPHEN 1000 MG: 500 TABLET, FILM COATED ORAL at 11:12

## 2024-12-14 RX ADMIN — FUROSEMIDE 20 MG: 20 TABLET ORAL at 09:12

## 2024-12-14 RX ADMIN — MORPHINE SULFATE 3 MG: 4 INJECTION, SOLUTION INTRAMUSCULAR; INTRAVENOUS at 05:12

## 2024-12-14 RX ADMIN — HYDROCODONE BITARTRATE AND ACETAMINOPHEN 1 TABLET: 7.5; 325 TABLET ORAL at 03:12

## 2024-12-14 RX ADMIN — THEOPHYLLINE ANHYDROUS 100 MG: 300 CAPSULE, EXTENDED RELEASE ORAL at 09:12

## 2024-12-14 RX ADMIN — ENOXAPARIN SODIUM 40 MG: 40 INJECTION SUBCUTANEOUS at 05:12

## 2024-12-14 RX ADMIN — FUROSEMIDE 20 MG: 20 TABLET ORAL at 08:12

## 2024-12-14 RX ADMIN — ISOSORBIDE MONONITRATE 30 MG: 30 TABLET, EXTENDED RELEASE ORAL at 09:12

## 2024-12-14 RX ADMIN — MELATONIN TAB 3 MG 6 MG: 3 TAB at 11:12

## 2024-12-14 RX ADMIN — ONDANSETRON 4 MG: 2 INJECTION INTRAMUSCULAR; INTRAVENOUS at 11:12

## 2024-12-14 RX ADMIN — FLECAINIDE ACETATE 50 MG: 50 TABLET ORAL at 08:12

## 2024-12-14 RX ADMIN — FLECAINIDE ACETATE 50 MG: 50 TABLET ORAL at 09:12

## 2024-12-14 RX ADMIN — PANTOPRAZOLE SODIUM 40 MG: 40 TABLET, DELAYED RELEASE ORAL at 09:12

## 2024-12-14 NOTE — PT/OT/SLP PROGRESS
"Physical Therapy Treatment    Patient Name:  Stephanie Gutierrez   MRN:  [id number]    Recommendations:     Discharge Recommendations:  (Rehab vs Home Health PT, pending medical progress)  Discharge Equipment Recommendations:    Barriers to discharge: Decreased caregiver support and current medical status    Assessment:     Stephanie Gutierrez is a 89 y.o. female admitted with a medical diagnosis of <principal problem not specified>.  She presents with the following impairments/functional limitations: pain, gait instability, impaired functional mobility, weakness .    Pt c/o nausea today, some left knee pain.  Pt states "I was not sure PT would come today.  I won't be able to do much andres I'm so nauseated but I will do what I can."  Pt was assisted to EOB with maxA.  She made 4 attempts to stand and walk with stand tolerance very brief at less than a minute, backward lean, needing to sit and rest EOB.  Pt was instructed seated BLE AROM ex [id number].  Pt stood again and was able to take 4 steps to HOB with RW and maxA to steady her and prevent a fall.  Pt states she is very nauseated again.  While she held emesis bag, therapist safely guided her back into bed and adjusted her body to center of bed and adjusted pillows, blankets, HOB angle several times to make pt comfortable.    Rehab Prognosis: Fair; patient would benefit from acute skilled PT services to address these deficits and reach maximum level of function.    Recent Surgery: * No surgery found *      Plan:     During this hospitalization, patient to be seen 5 x/week (5-6x/week, 1-2x/day) to address the identified rehab impairments via gait training, therapeutic activities, therapeutic exercises and progress toward the following goals:    Plan of Care Expires:  01/13/25    Subjective     Chief Complaint: nausea, left knee pain    Patient/Family Comments/goals: to stand up with therapist and try to walk  Pain/Comfort:         Objective:     Communicated with pt and nurse " prior to session.  Patient found HOB elevated with  emesis bag in bed upon PT entry to room.     General Precautions: Standard, fall  Orthopedic Precautions:    Braces:    Respiratory Status: Room air     Functional Mobility:  Bed Mobility:     Supine to Sit: maximal assistance  Transfers:     Sit to Stand:  maximal assistance with rolling walker  Gait: amb 4 steps to HOB maxA to prevent leaning backward and physical assist for advancement of RW      AM-PAC 6 CLICK MOBILITY          Treatment & Education:  Pt performed BLE AROM ex EOB with PT [id number].  She made 4 attempts to stand with walker and req. maxA and was unable to stand more than a minute at a time until final attempt, stood with less backward lean and was able to make steps to HOB.  Due to nausea pt was unable to cont and needed maxA to get into bed and make her comfortable.    Patient left HOB elevated with all lines intact, call button in reach, bed alarm on, and emesis bag in pt hand ..    GOALS:   Multidisciplinary Problems       Physical Therapy Goals          Problem: Physical Therapy    Goal Priority Disciplines Outcome Interventions   Physical Therapy Goal     PT, PT/OT Progressing    Description: Goals to be met by: discharge     Patient will increase functional independence with mobility by performin. Sit to stand transfer with Contact Guard Assistance  2. Bed to chair transfer with Contact Guard Assistance using Standard Walker  3. Gait  x 15 feet with Contact Guard Assistance using Standard Walker.                          Time Tracking:     PT Received On: 24  PT Start Time: 1230     PT Stop Time: 1300  PT Total Time (min): 30 min     Billable Minutes: Therapeutic Activity 15 and Therapeutic Exercise 15    Treatment Type: Treatment  PT/PTA: PT           2024

## 2024-12-15 LAB
ANION GAP SERPL CALC-SCNC: 3 MEQ/L (ref 2–13)
BASOPHILS # BLD AUTO: 0.04 X10(3)/MCL (ref 0.01–0.08)
BASOPHILS NFR BLD AUTO: 1 % (ref 0.1–1.2)
BUN SERPL-MCNC: 27 MG/DL (ref 7–20)
CALCIUM SERPL-MCNC: 9 MG/DL (ref 8.4–10.2)
CHLORIDE SERPL-SCNC: 99 MMOL/L (ref 98–110)
CO2 SERPL-SCNC: 34 MMOL/L (ref 21–32)
CREAT SERPL-MCNC: 0.84 MG/DL (ref 0.66–1.25)
CREAT/UREA NIT SERPL: 32 (ref 12–20)
EOSINOPHIL # BLD AUTO: 0.1 X10(3)/MCL (ref 0.04–0.36)
EOSINOPHIL NFR BLD AUTO: 2.6 % (ref 0.7–7)
ERYTHROCYTE [DISTWIDTH] IN BLOOD BY AUTOMATED COUNT: 13 % (ref 11–14.5)
GFR SERPLBLD CREATININE-BSD FMLA CKD-EPI: 66 ML/MIN/1.73/M2
GLUCOSE SERPL-MCNC: 83 MG/DL (ref 70–115)
HCT VFR BLD AUTO: 29.7 % (ref 36–48)
HGB BLD-MCNC: 9.3 G/DL (ref 11.8–16)
IMM GRANULOCYTES # BLD AUTO: 0.01 X10(3)/MCL (ref 0–0.03)
IMM GRANULOCYTES NFR BLD AUTO: 0.3 % (ref 0–0.5)
LYMPHOCYTES # BLD AUTO: 0.79 X10(3)/MCL (ref 1.16–3.74)
LYMPHOCYTES NFR BLD AUTO: 20.2 % (ref 20–55)
MCH RBC QN AUTO: 28 PG (ref 27–34)
MCHC RBC AUTO-ENTMCNC: 31.3 G/DL (ref 31–37)
MCV RBC AUTO: 89.5 FL (ref 79–99)
MONOCYTES # BLD AUTO: 0.39 X10(3)/MCL (ref 0.24–0.36)
MONOCYTES NFR BLD AUTO: 10 % (ref 4.7–12.5)
NEUTROPHILS # BLD AUTO: 2.58 X10(3)/MCL (ref 1.56–6.13)
NEUTROPHILS NFR BLD AUTO: 65.9 % (ref 37–73)
NRBC BLD AUTO-RTO: 0 %
PLATELET # BLD AUTO: 269 X10(3)/MCL (ref 140–371)
PMV BLD AUTO: 9.7 FL (ref 9.4–12.4)
POTASSIUM SERPL-SCNC: 4.2 MMOL/L (ref 3.5–5.1)
RBC # BLD AUTO: 3.32 X10(6)/MCL (ref 4–5.1)
SODIUM SERPL-SCNC: 136 MMOL/L (ref 136–145)
WBC # BLD AUTO: 3.91 X10(3)/MCL (ref 4–11.5)

## 2024-12-15 PROCEDURE — 25000003 PHARM REV CODE 250: Performed by: FAMILY MEDICINE

## 2024-12-15 PROCEDURE — 63600175 PHARM REV CODE 636 W HCPCS: Performed by: FAMILY MEDICINE

## 2024-12-15 PROCEDURE — 99900031 HC PATIENT EDUCATION (STAT)

## 2024-12-15 PROCEDURE — G0378 HOSPITAL OBSERVATION PER HR: HCPCS

## 2024-12-15 PROCEDURE — 36415 COLL VENOUS BLD VENIPUNCTURE: CPT | Performed by: FAMILY MEDICINE

## 2024-12-15 PROCEDURE — 85025 COMPLETE CBC W/AUTO DIFF WBC: CPT | Performed by: FAMILY MEDICINE

## 2024-12-15 PROCEDURE — 96376 TX/PRO/DX INJ SAME DRUG ADON: CPT

## 2024-12-15 PROCEDURE — 96372 THER/PROPH/DIAG INJ SC/IM: CPT | Performed by: FAMILY MEDICINE

## 2024-12-15 PROCEDURE — 94761 N-INVAS EAR/PLS OXIMETRY MLT: CPT

## 2024-12-15 PROCEDURE — 80048 BASIC METABOLIC PNL TOTAL CA: CPT | Performed by: FAMILY MEDICINE

## 2024-12-15 RX ORDER — ENOXAPARIN SODIUM 100 MG/ML
30 INJECTION SUBCUTANEOUS EVERY 24 HOURS
Status: DISCONTINUED | OUTPATIENT
Start: 2024-12-15 | End: 2024-12-16 | Stop reason: HOSPADM

## 2024-12-15 RX ADMIN — FLECAINIDE ACETATE 50 MG: 50 TABLET ORAL at 08:12

## 2024-12-15 RX ADMIN — ONDANSETRON 4 MG: 2 INJECTION INTRAMUSCULAR; INTRAVENOUS at 12:12

## 2024-12-15 RX ADMIN — ONDANSETRON 4 MG: 2 INJECTION INTRAMUSCULAR; INTRAVENOUS at 05:12

## 2024-12-15 RX ADMIN — ENOXAPARIN SODIUM 30 MG: 30 INJECTION SUBCUTANEOUS at 05:12

## 2024-12-15 RX ADMIN — FUROSEMIDE 20 MG: 20 TABLET ORAL at 08:12

## 2024-12-15 RX ADMIN — ISOSORBIDE MONONITRATE 30 MG: 30 TABLET, EXTENDED RELEASE ORAL at 09:12

## 2024-12-15 RX ADMIN — POTASSIUM CHLORIDE 20 MEQ: 1500 TABLET, EXTENDED RELEASE ORAL at 09:12

## 2024-12-15 RX ADMIN — HYDROCODONE BITARTRATE AND ACETAMINOPHEN 1 TABLET: 7.5; 325 TABLET ORAL at 05:12

## 2024-12-15 RX ADMIN — CLOPIDOGREL BISULFATE 75 MG: 75 TABLET ORAL at 09:12

## 2024-12-15 RX ADMIN — THEOPHYLLINE ANHYDROUS 100 MG: 300 CAPSULE, EXTENDED RELEASE ORAL at 10:12

## 2024-12-15 RX ADMIN — HYDROCODONE BITARTRATE AND ACETAMINOPHEN 1 TABLET: 7.5; 325 TABLET ORAL at 08:12

## 2024-12-15 RX ADMIN — FLECAINIDE ACETATE 50 MG: 50 TABLET ORAL at 09:12

## 2024-12-15 RX ADMIN — PANTOPRAZOLE SODIUM 40 MG: 40 TABLET, DELAYED RELEASE ORAL at 09:12

## 2024-12-15 RX ADMIN — ASPIRIN 81 MG: 81 TABLET, COATED ORAL at 09:12

## 2024-12-15 NOTE — PROGRESS NOTES
Hospital Medicine  Progress Note    Patient Name: Stephanie Gutierrez  MRN: [id number]  Status: OP- Observation   Admission Date: 12/13/2024  Length of Stay: 0  Date of Service: 12/14/2024       CC: hospital follow-up for        SUBJECTIVE   89 yo F with atraumatic left knee pain and swelling. No calf pain or swelling. Onset was 2 days ago No fevers chills sweats. She lives alone. She states that the pain is such that she can no longer self care for herself. She can not bathe toilet transfer. Nobody at home to assist her. Xray L knee confirmed severe OA, large effusion. Ortho consulted and will see patient in consult.     12/14/2024  Pain better controlled  L knee swelling significantly decreased overnight   Work with PT  [facility]     Today: Patient seen and examined at bedside, and chart reviewed.       MEDICATIONS   Scheduled   aspirin  81 mg Oral Daily    clopidogreL  75 mg Oral Daily    enoxparin  40 mg Subcutaneous Daily    flecainide  50 mg Oral Q12H    furosemide  20 mg Oral BID    isosorbide mononitrate  30 mg Oral Daily    pantoprazole  40 mg Oral Daily    potassium chloride SA  20 mEq Oral Daily    theophylline  100 mg Oral Daily     Continuous Infusions        PHYSICAL EXAM   VITALS: T 97.4 °F (36.3 °C)   BP (!) 106/46   P (!) 56   RR 18   O2 (!) 92 %    GENERAL: Awake and in NAD  LUNGS: CTA B/L  CVS: Normal rate  GI/: Soft, NT, bowel sounds positive.  EXTREMITIES: No peripheral edema, Left knee swelling improved, cont pain with movement   NEURO: AAOx3  PSYCH: Cooperative      LABS   CBC  Recent Labs     12/13/24  0536   WBC 8.30   RBC 3.95*   HGB 11.1*   HCT 35.0*   MCV 88.6   MCH 28.1   MCHC 31.7   RDW 13.0        CHEM  Recent Labs     12/13/24  0536      K 3.9   CO2 30   BUN 23*   CREATININE 0.62*   GLUCOSE 84   CALCIUM 10.0   ALBUMIN 4.0   GLOBULIN 2.0   ALKPHOS 86   ALT 9   AST 14   BILITOT 0.6         MICROBIOLOGY     Microbiology Results (last 7 days)       ** No  results found for the last 168 hours. **              DIAGNOSTICS   X-Ray Chest AP Portable  Narrative: EXAMINATION:  Single view chest radiograph.    CLINICAL HISTORY:  Unspecified atrial fibrillation    TECHNIQUE:  Single view of the chest.    COMPARISON:  Chest radiograph 08/02/2022.    FINDINGS:  The lungs are hyperinflated without consolidation or effusion.  There is no pneumothorax.  The cardiac silhouette is enlarged.  There is no acute osseous abnormality.  Impression: Hyperinflation and cardiomegaly without acute cardiopulmonary abnormality.    Electronically signed by: Saqib Richards MD  Date:    12/13/2024  Time:    08:16  X-Ray Knee 3 View Left  Narrative: EXAMINATION:  Three radiographic views of the LEFT KNEE.    CLINICAL HISTORY:  Pain in unspecified joint    TECHNIQUE:  Three radiographic views of the LEFT KNEE.    COMPARISON:  None.    FINDINGS:  Three views of the left knee demonstrate severe tricompartmental osteoarthritis.  No displaced fracture is identified.  There is a large joint effusion.  There is no bony mass lesion.  There is no soft tissue swelling.  There is extensive atherosclerotic calcification.  Impression: Severe tricompartmental osteoarthritis with a large joint effusion.  No displaced fractures identified.    Electronically signed by: Saqib Richards MD  Date:    12/13/2024  Time:    06:50      ASSESSMENT   Left Knee OA, Severe  Impaired Mobility 2/2 above  COPD  PAF     PLAN      Pain control  Orthopedic surgery consulted  Cont PT  Resumed home meds  Case mgmt consult for rehab placement      Prophylaxis: lovenox  Code Status: full                Manuel Wahl MD  [facility] Medicine

## 2024-12-15 NOTE — PROGRESS NOTES
Hospital Medicine  Progress Note    Patient Name: Stephanie Gutierrez  MRN: [id number]  Status: OP- Observation   Admission Date: 12/13/2024  Length of Stay: 0  Date of Service: 12/15/2024       CC: hospital follow-up for        SUBJECTIVE     89 yo F with atraumatic left knee pain and swelling. No calf pain or swelling. Onset was 2 days ago No fevers chills sweats. She lives alone. She states that the pain is such that she can no longer self care for herself. She can not bathe toilet transfer. Nobody at home to assist her. Xray L knee confirmed severe OA, large effusion. Ortho consulted and will see patient in consult.      12/14/2024  Pain better controlled  L knee swelling significantly decreased overnight   Work with PT  Case mgmt work on [facility]      12/15/2024  Knee pain/swelling cont to improve  Case mgmt work on Rehab    Today: Patient seen and examined at bedside, and chart reviewed.       MEDICATIONS   Scheduled   aspirin  81 mg Oral Daily    clopidogreL  75 mg Oral Daily    enoxparin  30 mg Subcutaneous Daily    flecainide  50 mg Oral Q12H    furosemide  20 mg Oral BID    isosorbide mononitrate  30 mg Oral Daily    pantoprazole  40 mg Oral Daily    potassium chloride SA  20 mEq Oral Daily    theophylline  100 mg Oral Daily     Continuous Infusions        PHYSICAL EXAM   VITALS: T 97.8 °F (36.6 °C)   BP (!) 109/56   P (!) 58   RR 18   O2 (!) 92 %    GENERAL: Awake and in NAD  LUNGS: CTA B/L  CVS: Normal rate  GI/: Soft, NT, bowel sounds positive.  EXTREMITIES: No peripheral edema  NEURO: AAOx3  PSYCH: Cooperative      LABS   CBC  Recent Labs     12/13/24  0536 12/15/24  0448   WBC 8.30 3.91*   RBC 3.95* 3.32*   HGB 11.1* 9.3*   HCT 35.0* 29.7*   MCV 88.6 89.5   MCH 28.1 28.0   MCHC 31.7 31.3   RDW 13.0 13.0    269     CHEM  Recent Labs     12/13/24  0536 12/15/24  0448    136   K 3.9 4.2   CO2 30 34*   BUN 23* 27*   CREATININE 0.62* 0.84   GLUCOSE 84 83   CALCIUM 10.0 9.0   ALBUMIN 4.0  --     GLOBULIN 2.0  --    ALKPHOS 86  --    ALT 9  --    AST 14  --    BILITOT 0.6  --          MICROBIOLOGY     Microbiology Results (last 7 days)       ** No results found for the last 168 hours. **              DIAGNOSTICS   X-Ray Chest AP Portable  Narrative: EXAMINATION:  Single view chest radiograph.    CLINICAL HISTORY:  Unspecified atrial fibrillation    TECHNIQUE:  Single view of the chest.    COMPARISON:  Chest radiograph 08/02/2022.    FINDINGS:  The lungs are hyperinflated without consolidation or effusion.  There is no pneumothorax.  The cardiac silhouette is enlarged.  There is no acute osseous abnormality.  Impression: Hyperinflation and cardiomegaly without acute cardiopulmonary abnormality.    Electronically signed by: Saqib Richards MD  Date:    12/13/2024  Time:    08:16  X-Ray Knee 3 View Left  Narrative: EXAMINATION:  Three radiographic views of the LEFT KNEE.    CLINICAL HISTORY:  Pain in unspecified joint    TECHNIQUE:  Three radiographic views of the LEFT KNEE.    COMPARISON:  None.    FINDINGS:  Three views of the left knee demonstrate severe tricompartmental osteoarthritis.  No displaced fracture is identified.  There is a large joint effusion.  There is no bony mass lesion.  There is no soft tissue swelling.  There is extensive atherosclerotic calcification.  Impression: Severe tricompartmental osteoarthritis with a large joint effusion.  No displaced fractures identified.    Electronically signed by: Saqib Richards MD  Date:    12/13/2024  Time:    06:50      ASSESSMENT   Left Knee OA, Severe  Impaired Mobility 2/2 above  COPD  PAF     PLAN      Pain control  Orthopedic surgery rec- Rehab  Cont PT  Case mgmt work on rehab placement           Manuel Wahl MD  [facility] Medicine

## 2024-12-16 VITALS
WEIGHT: 93 LBS | OXYGEN SATURATION: 95 % | HEART RATE: 69 BPM | TEMPERATURE: 100 F | RESPIRATION RATE: 18 BRPM | DIASTOLIC BLOOD PRESSURE: 54 MMHG | SYSTOLIC BLOOD PRESSURE: 132 MMHG | BODY MASS INDEX: 17.56 KG/M2 | HEIGHT: 61 IN

## 2024-12-16 PROBLEM — Z74.09 MOBILITY IMPAIRED: Status: ACTIVE | Noted: 2024-12-16

## 2024-12-16 PROBLEM — I35.0 AORTIC STENOSIS: Status: ACTIVE | Noted: 2024-12-16

## 2024-12-16 PROBLEM — I48.0 PAF (PAROXYSMAL ATRIAL FIBRILLATION): Status: ACTIVE | Noted: 2024-12-16

## 2024-12-16 PROBLEM — D64.9 ANEMIA: Status: ACTIVE | Noted: 2024-12-16

## 2024-12-16 PROBLEM — M17.12 PRIMARY OSTEOARTHRITIS OF LEFT KNEE: Status: ACTIVE | Noted: 2024-12-16

## 2024-12-16 PROCEDURE — 25000003 PHARM REV CODE 250: Performed by: FAMILY MEDICINE

## 2024-12-16 PROCEDURE — G0378 HOSPITAL OBSERVATION PER HR: HCPCS

## 2024-12-16 PROCEDURE — 94761 N-INVAS EAR/PLS OXIMETRY MLT: CPT

## 2024-12-16 PROCEDURE — 97530 THERAPEUTIC ACTIVITIES: CPT

## 2024-12-16 RX ORDER — HYDROCODONE BITARTRATE AND ACETAMINOPHEN 7.5; 325 MG/1; MG/1
1 TABLET ORAL EVERY 6 HOURS PRN
Start: 2024-12-16

## 2024-12-16 RX ORDER — ACETAMINOPHEN 500 MG
1000 TABLET ORAL EVERY 6 HOURS PRN
Start: 2024-12-16

## 2024-12-16 RX ADMIN — ASPIRIN 81 MG: 81 TABLET, COATED ORAL at 09:12

## 2024-12-16 RX ADMIN — ISOSORBIDE MONONITRATE 30 MG: 30 TABLET, EXTENDED RELEASE ORAL at 09:12

## 2024-12-16 RX ADMIN — POTASSIUM CHLORIDE 20 MEQ: 1500 TABLET, EXTENDED RELEASE ORAL at 09:12

## 2024-12-16 RX ADMIN — THEOPHYLLINE ANHYDROUS 100 MG: 300 CAPSULE, EXTENDED RELEASE ORAL at 09:12

## 2024-12-16 RX ADMIN — FLECAINIDE ACETATE 50 MG: 50 TABLET ORAL at 09:12

## 2024-12-16 RX ADMIN — CLOPIDOGREL BISULFATE 75 MG: 75 TABLET ORAL at 09:12

## 2024-12-16 RX ADMIN — FUROSEMIDE 20 MG: 20 TABLET ORAL at 09:12

## 2024-12-16 RX ADMIN — PANTOPRAZOLE SODIUM 40 MG: 40 TABLET, DELAYED RELEASE ORAL at 09:12

## 2024-12-16 NOTE — PLAN OF CARE
Physician ordered to discharge patient to [facility]. [facility] was notified per phone/fax of Physician's discharge orders, spoke with Gopi. [facility] nurse to call [facility] nurse when ready for report on patient.

## 2024-12-16 NOTE — DISCHARGE SUMMARY
[facility]/Surg  [facility] Medicine  Discharge Summary      Patient Name: Stephanie Gutierrez  MRN: [id number]  JUSTYNA: [id number]  Patient Class: OP- Observation  Admission Date: 12/13/2024  Hospital Length of Stay: 0 days  Discharge Date and Time: No discharge date for patient encounter.  Attending Physician: Manuel Wahl MD   Discharging Provider: Bonita Ny MD  Primary Care Provider: Sj Paige MD    Primary Care Team: Networked reference to record PCT     HPI:   89 yo F with atraumatic left knee pain and swelling. No calf pain or swelling. Onset was 2 days ago No fevers chills sweats. She lives alone. She states that the pain is such that she can no longer self care for herself. She can not bathe toilet transfer. Nobody at home to assist her. Xray L knee confirmed severe OA, large effusion. Ortho consulted and will see patient in consult.    .    * No surgery found *      Hospital Course:      12/14/2024  Pain better controlled  L knee swelling significantly decreased overnight   Work with PT  Case mgmt work on [facility]      12/15/2024  Knee pain/swelling cont to improve  Case mgmt work on Rehab  12/16/2024 DISCHARGE SUMMARY: PT admitted with non traumatic knee pain and worsening swelling, difficulty walking, usually lives at home and very independent, xray shows severe osteoarthritis.  Pt with h/o aortic stenosis followed by DR. Donny Mahoney in , pt states DR. Mahoney told her she may need valve surgery soon.  She would likely not be a candidate for knee replacement but could defer to orthopedics given she is highly functional at home and independent.  At this time short course of rehab is indicated and she has been accepted to [facility] for transfer today.   Toda/y: Patient seen and examined at bedside, and chart reviewed     Goals of Care Treatment Preferences:  Code Status: Full Code      SDOH Screening:  The patient was screened for utility difficulties, food insecurity,  transport difficulties, housing insecurity, and interpersonal safety and there were no concerns identified this admission.     Consults:   Consults (From admission, onward)          Status Ordering Provider     Inpatient consult to Social Work/Case Management  Once        Provider:  (Not yet assigned)    Acknowledged BRITTANY JUNG.     Inpatient consult to Hospitalist  Once        Provider:  Manuel Wahl MD    Completed BRITTANY JUNG     Inpatient consult to Hospitalist  Once        Provider:  Manuel Wahl MD    Completed BRITTANY JUNG.            No notes have been filed under this hospital service.  Service: Hospital Medicine    Final Active Diagnoses:    Diagnosis Date Noted POA    PRINCIPAL PROBLEM:  Primary osteoarthritis of left knee [M17.12] 12/16/2024 Yes    Mobility impaired [Z74.09] 12/16/2024 Yes    Anemia [D64.9] 12/16/2024 Yes    PAF (paroxysmal atrial fibrillation) [I48.0] 12/16/2024 Yes    Aortic stenosis [I35.0] 12/16/2024 Yes      Problems Resolved During this Admission:       Discharged Condition: good    Disposition: Rehab Facility    Follow Up:   Contact information for follow-up providers       Sj Paige MD Follow up.    Specialty: Internal Medicine  Contact information:  [address] [facility] [address] [phone]               Donny Mahoney MD Follow up.    Specialties: Cardiovascular Disease, Cardiology  Why: keep appt March  Contact information:  [address]  [facility] [address] [phone]                       Contact information for after-discharge care       Destination       [facility] .    Service: Inpatient Rehabilitation  Contact information:  [address], [address]  [facility] [address] [phone]                                 Patient Instructions:      Activity as tolerated       Significant Diagnostic Studies: Labs: CMP   Recent  Labs   Lab 12/15/24  0448      K 4.2   CL 99   CO2 34*   BUN 27*   CREATININE 0.84   CALCIUM 9.0    and CBC   Recent Labs   Lab 12/15/24  0448   WBC 3.91*   HGB 9.3*   HCT 29.7*          Pending Diagnostic Studies:       None           Medications:  Reconciled Home Medications:      Medication List        START taking these medications      acetaminophen 500 MG tablet  Commonly known as: TYLENOL  Take 2 tablets (1,000 mg total) by mouth every 6 (six) hours as needed.     HYDROcodone-acetaminophen 7.5-325 mg per tablet  Commonly known as: NORCO  Take 1 tablet by mouth every 6 (six) hours as needed.            CONTINUE taking these medications      aspirin 81 MG EC tablet  Commonly known as: ECOTRIN  Take 81 mg by mouth once daily.     clopidogreL 75 mg tablet  Commonly known as: PLAVIX  Take 75 mg by mouth once daily.     flecainide 50 MG Tab  Commonly known as: TAMBOCOR  Take 50 mg by mouth every 12 (twelve) hours.     furosemide 20 MG tablet  Commonly known as: LASIX  Take 20 mg by mouth 2 (two) times daily.     IRON 100 PLUS ORAL  Take 1 tablet by mouth once daily.     isosorbide mononitrate 30 MG 24 hr tablet  Commonly known as: IMDUR  Take 30 mg by mouth once daily.     pantoprazole 40 MG tablet  Commonly known as: PROTONIX  Take 40 mg by mouth once daily.     potassium chloride SA 20 MEQ tablet  Commonly known as: K-DUR,KLOR-CON  Take 20 mEq by mouth once daily.     theophylline 100 MG 24 hr capsule  Commonly known as: CAROLINE-24  Take 100 mg by mouth once daily.              Indwelling Lines/Drains at time of discharge:   Lines/Drains/Airways       Drain  Duration             Female External Urinary Catheter w/ Suction 12/14/24 0644 2 days                    Time spent on the discharge of patient: 37 minutes  Patient Screened for food insecurity, housing instability, transportation needs, utility difficulties, and interpersonal safety.  No needs identified     Physical Exam  Vitals and nursing note  reviewed. Exam conducted with a chaperone present.   Constitutional:       General: She is not in acute distress.     Appearance: Normal appearance. She is normal weight. She is not ill-appearing.      Comments: Thin and frail   Cardiovascular:      Rate and Rhythm: Normal rate and regular rhythm.      Pulses: Normal pulses.      Heart sounds: Normal heart sounds.   Pulmonary:      Effort: Pulmonary effort is normal.      Breath sounds: Normal breath sounds.   Abdominal:      General: Abdomen is flat.      Palpations: Abdomen is soft.   Musculoskeletal:         General: Deformity (musculoskeletaal changes of OA carie UE and LE throughout) present.      Right lower leg: No edema.      Left lower leg: No edema.   Skin:     General: Skin is warm and dry.      Findings: No erythema, lesion or rash.   Neurological:      Mental Status: She is alert.   Psychiatric:         Behavior: Behavior normal.      Comments: I had a face to face encounter with this patient prior to d/c           Bonita Ny MD  Department of Hospital Medicine  [facility]

## 2024-12-16 NOTE — HPI
89 yo F with atraumatic left knee pain and swelling. No calf pain or swelling. Onset was 2 days ago No fevers chills sweats. She lives alone. She states that the pain is such that she can no longer self care for herself. She can not bathe toilet transfer. Nobody at home to assist her. Xray L knee confirmed severe OA, large effusion. Ortho consulted and will see patient in consult.    .

## 2024-12-16 NOTE — PLAN OF CARE
Problem: Adult Inpatient Plan of Care  Goal: Plan of Care Review  Outcome: Met  Goal: Patient-Specific Goal (Individualized)  Outcome: Met  Goal: Absence of Hospital-Acquired Illness or Injury  Outcome: Met  Goal: Optimal Comfort and Wellbeing  Outcome: Met  Goal: Readiness for Transition of Care  Outcome: Met     Problem: Fall Injury Risk  Goal: Absence of Fall and Fall-Related Injury  Outcome: Met     Problem: Wound  Goal: Optimal Coping  Outcome: Met  Goal: Optimal Functional Ability  Outcome: Met  Goal: Absence of Infection Signs and Symptoms  Outcome: Met  Goal: Improved Oral Intake  Outcome: Met  Goal: Optimal Pain Control and Function  Outcome: Met  Goal: Skin Health and Integrity  Outcome: Met  Goal: Optimal Wound Healing  Outcome: Met

## 2024-12-16 NOTE — PROGRESS NOTES
[facility]-Med/Surg  Hospital Medicine  Progress Note    Patient Name: Stephanie Gutierrez  MRN: [id number]  Patient Class: OP- Observation   Admission Date: 12/13/2024  Length of Stay: 0 days  Attending Physician: Manuel Wahl MD  Primary Care Provider: Sj Paige MD        Subjective     Principal Problem:Primary osteoarthritis of left knee        HPI:  89 yo F with atraumatic left knee pain and swelling. No calf pain or swelling. Onset was 2 days ago No fevers chills sweats. She lives alone. She states that the pain is such that she can no longer self care for herself. She can not bathe toilet transfer. Nobody at home to assist her. Xray L knee confirmed severe OA, large effusion. Ortho consulted and will see patient in consult.    .    Overview/Hospital Course:     12/14/2024  Pain better controlled  L knee swelling significantly decreased overnight   Work with PT  Case mgmt work on [facility]      12/15/2024  Knee pain/swelling cont to improve  Case mgmt work on Rehab  12/16/2024 DISCHARGE SUMMARY: PT admitted with non traumatic knee pain and worsening swelling, difficulty walking, usually lives at home and very independent, xray shows severe osteoarthritis.  Pt with h/o aortic stenosis followed by DR. Donny Mahoney in , pt states DR. Mahoney told her she may need valve surgery soon.  She would likely not be a candidate for knee replacement but could defer to orthopedics given she is highly functional at home and independent.  At this time short course of rehab is indicated and she has been accepted to [facility] for transfer today.   Toda/y: Patient seen and examined at bedside, and chart reviewed    No new subjective & objective note has been filed under this hospital service since the last note was generated.      Assessment and Plan     No notes have been filed under this hospital service.  Service: Hospital Medicine    VTE Risk Mitigation (From admission, onward)           Ordered      enoxaparin injection 30 mg  Daily         12/15/24 0837     IP VTE HIGH RISK PATIENT  Once         12/13/24 1223                    Discharge Planning   DION: 12/16/2024     Code Status: Full Code   Medical Readiness for Discharge Date: 12/16/2024  Discharge Plan A: Rehab            Patient Screened for food insecurity, housing instability, transportation needs, utility difficulties, and interpersonal safety.  No needs identified      Physical Exam  Vitals and nursing note reviewed. Exam conducted with a chaperone present.   Constitutional:       General: She is not in acute distress.     Appearance: Normal appearance. She is normal weight. She is not ill-appearing.      Comments: Thin and frail, arthritic changes in bilal UE and LE   Cardiovascular:      Rate and Rhythm: Normal rate and regular rhythm.      Pulses: Normal pulses.      Heart sounds: Normal heart sounds.   Pulmonary:      Effort: Pulmonary effort is normal.      Breath sounds: Normal breath sounds.   Abdominal:      General: Abdomen is flat.      Palpations: Abdomen is soft.   Musculoskeletal:         General: Tenderness and deformity (arthritis throughout musculoskeletal system) present.   Skin:     General: Skin is warm and dry.      Findings: No erythema, lesion or rash.   Neurological:      Mental Status: She is alert. Mental status is at baseline.   Psychiatric:         Behavior: Behavior normal.      Comments: I had a face to face encounter with this patient prior to d/c               Bonita Ny MD  Department of Hospital Medicine   [facility]/Surg

## 2024-12-16 NOTE — HOSPITAL COURSE
12/14/2024  Pain better controlled  L knee swelling significantly decreased overnight   Work with PT  Case mgmt work on [facility]      12/15/2024  Knee pain/swelling cont to improve  Case mgmt work on Rehab  12/16/2024 DISCHARGE SUMMARY: PT admitted with non traumatic knee pain and worsening swelling, difficulty walking, usually lives at home and very independent, xray shows severe osteoarthritis.  Pt with h/o aortic stenosis followed by DR. Donny Mahoney in , pt states DR. Mahoney told her she may need valve surgery soon.  She would likely not be a candidate for knee replacement but could defer to orthopedics given she is highly functional at home and independent.  At this time short course of rehab is indicated and she has been accepted to [facility] for transfer today.   Toda/y: Patient seen and examined at bedside, and chart reviewed

## 2024-12-16 NOTE — PLAN OF CARE
Problem: Physical Therapy  Goal: Physical Therapy Goal  Description: Goals to be met by: discharge     Patient will increase functional independence with mobility by performin. Sit to stand transfer with Contact Guard Assistance  2. Bed to chair transfer with Contact Guard Assistance using Standard Walker  3. Gait  x 15 feet with Contact Guard Assistance using Standard Walker.     Outcome: Adequate for Care Transition

## 2024-12-16 NOTE — PT/OT/SLP DISCHARGE
Physical Therapy Discharge Summary    Name: Stephanie Gutierrez  MRN: [id number]   Principal Problem: Primary osteoarthritis of left knee     Patient Discharged from acute Physical Therapy on 24.  Please refer to prior PT noted date on 24 for functional status.     Assessment:     Patient appropriate for care in another setting.    Objective:     GOALS:   Multidisciplinary Problems       Physical Therapy Goals          Problem: Physical Therapy    Goal Priority Disciplines Outcome Interventions   Physical Therapy Goal     PT, PT/OT Adequate for Care Transition    Description: Goals to be met by: discharge     Patient will increase functional independence with mobility by performin. Sit to stand transfer with Contact Guard Assistance  2. Bed to chair transfer with Contact Guard Assistance using Standard Walker  3. Gait  x 15 feet with Contact Guard Assistance using Standard Walker.                          Reasons for Discontinuation of Therapy Services  Transfer to alternate level of care.      Plan:     Patient Discharged to: Inpatient Rehab.      2024

## 2024-12-16 NOTE — NURSING
Report given to SUYAPA Ospina. Nurse verbalized understanding. Rehab  nurse requested patient IV site be left intact. Flushes well. IV secured and saline locked and capped

## 2024-12-16 NOTE — PLAN OF CARE
12/16/24 1511   GUTIERRES Message   Medicare Outpatient and Observation Notification regarding financial responsibility Explained to patient/caregiver;Signed/date by patient/caregiver;Given to patient/caregiver   Date GUTIERRES was signed 12/13/24   Time GUTIERRES was signed 114

## 2025-02-05 ENCOUNTER — HOSPITAL ENCOUNTER (OUTPATIENT)
Dept: RADIOLOGY | Facility: HOSPITAL | Age: OVER 89
Discharge: HOME | End: 2025-02-05
Attending: INTERNAL MEDICINE
Payer: MEDICARE

## 2025-02-05 DIAGNOSIS — R29.898 LEFT ARM WEAKNESS: ICD-10-CM

## 2025-02-05 PROCEDURE — 70551 MRI BRAIN STEM W/O DYE: CPT | Mod: TC

## 2025-04-08 ENCOUNTER — LAB REQUISITION (OUTPATIENT)
Dept: LAB | Facility: HOSPITAL | Age: OVER 89
End: 2025-04-08
Payer: MEDICARE

## 2025-04-08 DIAGNOSIS — I25.10 ATHEROSCLEROTIC HEART DISEASE OF NATIVE CORONARY ARTERY WITHOUT ANGINA PECTORIS: ICD-10-CM

## 2025-04-08 LAB
ANION GAP SERPL CALC-SCNC: 5 MEQ/L (ref 2–13)
BUN SERPL-MCNC: 22 MG/DL (ref 7–20)
CALCIUM SERPL-MCNC: 9.8 MG/DL (ref 8.4–10.2)
CHLORIDE SERPL-SCNC: 101 MMOL/L (ref 98–110)
CO2 SERPL-SCNC: 30 MMOL/L (ref 21–32)
CREAT SERPL-MCNC: 0.67 MG/DL (ref 0.66–1.25)
CREAT/UREA NIT SERPL: 33 (ref 12–20)
GFR SERPLBLD CREATININE-BSD FMLA CKD-EPI: 83 ML/MIN/1.73/M2
GLUCOSE SERPL-MCNC: 123 MG/DL (ref 70–115)
POTASSIUM SERPL-SCNC: 4.5 MMOL/L (ref 3.5–5.1)
SODIUM SERPL-SCNC: 136 MMOL/L (ref 136–145)

## 2025-04-08 PROCEDURE — 80048 BASIC METABOLIC PNL TOTAL CA: CPT | Performed by: NURSE PRACTITIONER

## 2025-04-21 ENCOUNTER — LAB REQUISITION (OUTPATIENT)
Dept: LAB | Facility: HOSPITAL | Age: OVER 89
End: 2025-04-21
Payer: MEDICARE

## 2025-04-21 DIAGNOSIS — I48.0 PAROXYSMAL ATRIAL FIBRILLATION: ICD-10-CM

## 2025-04-21 LAB
ALBUMIN SERPL-MCNC: 3.9 G/DL (ref 3.4–5)
ALBUMIN/GLOB SERPL: 1.9 RATIO
ALP SERPL-CCNC: 87 UNIT/L (ref 50–144)
ALT SERPL-CCNC: 12 UNIT/L (ref 1–45)
ANION GAP SERPL CALC-SCNC: 3 MEQ/L (ref 2–13)
AST SERPL-CCNC: 18 UNIT/L (ref 14–36)
BASOPHILS # BLD AUTO: 0.04 X10(3)/MCL (ref 0.01–0.08)
BASOPHILS NFR BLD AUTO: 0.6 % (ref 0.1–1.2)
BILIRUB SERPL-MCNC: 0.3 MG/DL (ref 0–1)
BNP BLD-MCNC: 290 PG/ML (ref 0–124.9)
BUN SERPL-MCNC: 16 MG/DL (ref 7–20)
CALCIUM SERPL-MCNC: 9.6 MG/DL (ref 8.4–10.2)
CHLORIDE SERPL-SCNC: 103 MMOL/L (ref 98–110)
CO2 SERPL-SCNC: 31 MMOL/L (ref 21–32)
CREAT SERPL-MCNC: 0.62 MG/DL (ref 0.66–1.25)
CREAT/UREA NIT SERPL: 26 (ref 12–20)
EOSINOPHIL # BLD AUTO: 0.15 X10(3)/MCL (ref 0.04–0.36)
EOSINOPHIL NFR BLD AUTO: 2.3 % (ref 0.7–7)
ERYTHROCYTE [DISTWIDTH] IN BLOOD BY AUTOMATED COUNT: 13.9 % (ref 11–14.5)
GFR SERPLBLD CREATININE-BSD FMLA CKD-EPI: 85 ML/MIN/1.73/M2
GLOBULIN SER-MCNC: 2.1 GM/DL (ref 2–3.9)
GLUCOSE SERPL-MCNC: 95 MG/DL (ref 70–115)
HCT VFR BLD AUTO: 34.9 % (ref 36–48)
HGB BLD-MCNC: 11.2 G/DL (ref 11.8–16)
IMM GRANULOCYTES # BLD AUTO: 0.01 X10(3)/MCL (ref 0–0.03)
IMM GRANULOCYTES NFR BLD AUTO: 0.2 % (ref 0–0.5)
LYMPHOCYTES # BLD AUTO: 0.66 X10(3)/MCL (ref 1.16–3.74)
LYMPHOCYTES NFR BLD AUTO: 10.1 % (ref 20–55)
MCH RBC QN AUTO: 29.2 PG (ref 27–34)
MCHC RBC AUTO-ENTMCNC: 32.1 G/DL (ref 31–37)
MCV RBC AUTO: 90.9 FL (ref 79–99)
MONOCYTES # BLD AUTO: 0.45 X10(3)/MCL (ref 0.24–0.36)
MONOCYTES NFR BLD AUTO: 6.9 % (ref 4.7–12.5)
NEUTROPHILS # BLD AUTO: 5.23 X10(3)/MCL (ref 1.56–6.13)
NEUTROPHILS NFR BLD AUTO: 79.9 % (ref 37–73)
NRBC BLD AUTO-RTO: 0 %
PLATELET # BLD AUTO: 346 X10(3)/MCL (ref 140–371)
PMV BLD AUTO: 9.7 FL (ref 9.4–12.4)
POTASSIUM SERPL-SCNC: 4.2 MMOL/L (ref 3.5–5.1)
PROT SERPL-MCNC: 6 GM/DL (ref 6.3–8.2)
RBC # BLD AUTO: 3.84 X10(6)/MCL (ref 4–5.1)
SODIUM SERPL-SCNC: 137 MMOL/L (ref 136–145)
WBC # BLD AUTO: 6.54 X10(3)/MCL (ref 4–11.5)

## 2025-04-21 PROCEDURE — 85025 COMPLETE CBC W/AUTO DIFF WBC: CPT | Performed by: INTERNAL MEDICINE

## 2025-04-21 PROCEDURE — 83880 ASSAY OF NATRIURETIC PEPTIDE: CPT | Performed by: INTERNAL MEDICINE

## 2025-04-21 PROCEDURE — 80053 COMPREHEN METABOLIC PANEL: CPT | Performed by: INTERNAL MEDICINE

## 2025-05-10 ENCOUNTER — HOSPITAL ENCOUNTER (EMERGENCY)
Facility: HOSPITAL | Age: OVER 89
Discharge: ANOTHER HOSPITAL | End: 2025-05-11
Attending: OTOLARYNGOLOGY
Payer: MEDICARE

## 2025-05-10 DIAGNOSIS — R06.00 DYSPNEA, UNSPECIFIED TYPE: ICD-10-CM

## 2025-05-10 DIAGNOSIS — E87.1 HYPONATREMIA: ICD-10-CM

## 2025-05-10 DIAGNOSIS — R79.89 ELEVATED BRAIN NATRIURETIC PEPTIDE (BNP) LEVEL: ICD-10-CM

## 2025-05-10 DIAGNOSIS — D64.9 ANEMIA, UNSPECIFIED TYPE: ICD-10-CM

## 2025-05-10 DIAGNOSIS — J44.1 COPD WITH ACUTE EXACERBATION: ICD-10-CM

## 2025-05-10 DIAGNOSIS — R06.02 SOB (SHORTNESS OF BREATH): Primary | ICD-10-CM

## 2025-05-10 DIAGNOSIS — I50.9 CONGESTIVE HEART FAILURE, UNSPECIFIED HF CHRONICITY, UNSPECIFIED HEART FAILURE TYPE: ICD-10-CM

## 2025-05-10 DIAGNOSIS — R79.89 D-DIMER, ELEVATED: ICD-10-CM

## 2025-05-10 DIAGNOSIS — J18.9 PNEUMONIA OF BOTH LUNGS DUE TO INFECTIOUS ORGANISM, UNSPECIFIED PART OF LUNG: ICD-10-CM

## 2025-05-10 DIAGNOSIS — R06.02 SHORTNESS OF BREATH: ICD-10-CM

## 2025-05-10 DIAGNOSIS — R79.89 ELEVATED TROPONIN: ICD-10-CM

## 2025-05-10 DIAGNOSIS — R07.9 CHEST PAIN: ICD-10-CM

## 2025-05-10 LAB
ALBUMIN SERPL-MCNC: 3.7 G/DL (ref 3.4–5)
ALBUMIN/GLOB SERPL: 1.3 RATIO
ALP SERPL-CCNC: 101 UNIT/L (ref 50–144)
ALT SERPL-CCNC: 15 UNIT/L (ref 1–45)
ANION GAP SERPL CALC-SCNC: 5 MEQ/L (ref 2–13)
AST SERPL-CCNC: 22 UNIT/L (ref 14–36)
BASE EXCESS BLD CALC-SCNC: 0.2 MMOL/L (ref -2–2)
BASOPHILS # BLD AUTO: 0.05 X10(3)/MCL (ref 0.01–0.08)
BASOPHILS NFR BLD AUTO: 0.6 % (ref 0.1–1.2)
BILIRUB SERPL-MCNC: 0.6 MG/DL (ref 0–1)
BLOOD GAS SAMPLE TYPE (OHS): ABNORMAL
BNP BLD-MCNC: 2670 PG/ML (ref 0–124.9)
BUN SERPL-MCNC: 19 MG/DL (ref 7–20)
CALCIUM SERPL-MCNC: 9.6 MG/DL (ref 8.4–10.2)
CHLORIDE SERPL-SCNC: 104 MMOL/L (ref 98–110)
CO2 SERPL-SCNC: 25 MMOL/L (ref 21–32)
COHGB MFR BLDA: 1.8 % (ref 0–1.5)
CREAT SERPL-MCNC: 0.46 MG/DL (ref 0.66–1.25)
CREAT/UREA NIT SERPL: 41 (ref 12–20)
D DIMER PPP IA.FEU-MCNC: 1.01 MG/L FEU (ref 0.19–0.5)
EOSINOPHIL # BLD AUTO: 0.02 X10(3)/MCL (ref 0.04–0.36)
EOSINOPHIL NFR BLD AUTO: 0.2 % (ref 0.7–7)
ERYTHROCYTE [DISTWIDTH] IN BLOOD BY AUTOMATED COUNT: 13.5 % (ref 11–14.5)
FLUAV AG UPPER RESP QL IA.RAPID: NOT DETECTED
FLUBV AG UPPER RESP QL IA.RAPID: NOT DETECTED
GFR SERPLBLD CREATININE-BSD FMLA CKD-EPI: 90 ML/MIN/1.73/M2
GLOBULIN SER-MCNC: 2.8 GM/DL (ref 2–3.9)
GLUCOSE SERPL-MCNC: 124 MG/DL (ref 70–115)
HCO3 BLDA-SCNC: 24.6 MMOL/L (ref 22–26)
HCT VFR BLD AUTO: 33.9 % (ref 36–48)
HGB BLD-MCNC: 11.1 G/DL (ref 11.8–16)
IMM GRANULOCYTES # BLD AUTO: 0.03 X10(3)/MCL (ref 0–0.03)
IMM GRANULOCYTES NFR BLD AUTO: 0.3 % (ref 0–0.5)
INHALED O2 CONCENTRATION: 34 %
IP (OHS): 0 CMH2O
LACTATE SERPL-SCNC: 1.1 MMOL/L (ref 0.4–2)
LPM (OHS): 3.5
LYMPHOCYTES # BLD AUTO: 0.52 X10(3)/MCL (ref 1.16–3.74)
LYMPHOCYTES NFR BLD AUTO: 5.9 % (ref 20–55)
MCH RBC QN AUTO: 29.4 PG (ref 27–34)
MCHC RBC AUTO-ENTMCNC: 32.7 G/DL (ref 31–37)
MCV RBC AUTO: 89.7 FL (ref 79–99)
METHGB MFR BLDA: 0 % (ref 0–1.5)
MONOCYTES # BLD AUTO: 1.11 X10(3)/MCL (ref 0.24–0.36)
MONOCYTES NFR BLD AUTO: 12.5 % (ref 4.7–12.5)
NEUTROPHILS # BLD AUTO: 7.13 X10(3)/MCL (ref 1.56–6.13)
NEUTROPHILS NFR BLD AUTO: 80.5 % (ref 37–73)
NRBC BLD AUTO-RTO: 0 %
OXYGEN DEVICE BLOOD GAS (OHS): ABNORMAL
PAW @ PEAK INSP FLOW SETTING VENT: 0 CMH20
PCO2 BLDA: 44.6 MMHG (ref 35–45)
PH BLDA: 7.37 [PH] (ref 7.35–7.45)
PLATELET # BLD AUTO: 402 X10(3)/MCL (ref 140–371)
PLATELET # BLD EST: ABNORMAL 10*3/UL
PMV BLD AUTO: 9.7 FL (ref 9.4–12.4)
PO2 BLDA: 104 MMHG (ref 80–105)
POTASSIUM SERPL-SCNC: 4.5 MMOL/L (ref 3.5–5.1)
PROT SERPL-MCNC: 6.5 GM/DL (ref 6.3–8.2)
RBC # BLD AUTO: 3.78 X10(6)/MCL (ref 4–5.1)
RBC MORPH BLD: NORMAL
RSV A 5' UTR RNA NPH QL NAA+PROBE: NEGATIVE
SAO2 % BLDA: 99 % (ref 95–100)
SARS-COV-2 RNA RESP QL NAA+PROBE: NEGATIVE
SODIUM SERPL-SCNC: 134 MMOL/L (ref 136–145)
TROPONIN I SERPL-MCNC: 0.07 NG/ML (ref 0–0.03)
TROPONIN I SERPL-MCNC: 0.13 NG/ML (ref 0–0.03)
TROPONIN I SERPL-MCNC: 0.17 NG/ML (ref 0–0.03)
WBC # BLD AUTO: 8.86 X10(3)/MCL (ref 4–11.5)

## 2025-05-10 PROCEDURE — 25000003 PHARM REV CODE 250: Performed by: OTOLARYNGOLOGY

## 2025-05-10 PROCEDURE — 87040 BLOOD CULTURE FOR BACTERIA: CPT | Performed by: OTOLARYNGOLOGY

## 2025-05-10 PROCEDURE — 96375 TX/PRO/DX INJ NEW DRUG ADDON: CPT

## 2025-05-10 PROCEDURE — 83880 ASSAY OF NATRIURETIC PEPTIDE: CPT | Performed by: OTOLARYNGOLOGY

## 2025-05-10 PROCEDURE — 85379 FIBRIN DEGRADATION QUANT: CPT | Performed by: OTOLARYNGOLOGY

## 2025-05-10 PROCEDURE — 99291 CRITICAL CARE FIRST HOUR: CPT

## 2025-05-10 PROCEDURE — 93005 ELECTROCARDIOGRAM TRACING: CPT

## 2025-05-10 PROCEDURE — 99900035 HC TECH TIME PER 15 MIN (STAT)

## 2025-05-10 PROCEDURE — 93010 ELECTROCARDIOGRAM REPORT: CPT | Mod: ,,, | Performed by: INTERNAL MEDICINE

## 2025-05-10 PROCEDURE — 25500020 PHARM REV CODE 255: Performed by: OTOLARYNGOLOGY

## 2025-05-10 PROCEDURE — 82803 BLOOD GASES ANY COMBINATION: CPT

## 2025-05-10 PROCEDURE — 96361 HYDRATE IV INFUSION ADD-ON: CPT

## 2025-05-10 PROCEDURE — 84484 ASSAY OF TROPONIN QUANT: CPT | Performed by: OTOLARYNGOLOGY

## 2025-05-10 PROCEDURE — 80053 COMPREHEN METABOLIC PANEL: CPT | Performed by: OTOLARYNGOLOGY

## 2025-05-10 PROCEDURE — 0241U COVID/RSV/FLU A&B PCR: CPT | Performed by: OTOLARYNGOLOGY

## 2025-05-10 PROCEDURE — 25000242 PHARM REV CODE 250 ALT 637 W/ HCPCS: Performed by: OTOLARYNGOLOGY

## 2025-05-10 PROCEDURE — 27000221 HC OXYGEN, UP TO 24 HOURS

## 2025-05-10 PROCEDURE — 94761 N-INVAS EAR/PLS OXIMETRY MLT: CPT | Mod: XB

## 2025-05-10 PROCEDURE — 83605 ASSAY OF LACTIC ACID: CPT | Performed by: OTOLARYNGOLOGY

## 2025-05-10 PROCEDURE — 63600175 PHARM REV CODE 636 W HCPCS: Performed by: OTOLARYNGOLOGY

## 2025-05-10 PROCEDURE — 94640 AIRWAY INHALATION TREATMENT: CPT

## 2025-05-10 PROCEDURE — 85025 COMPLETE CBC W/AUTO DIFF WBC: CPT | Performed by: OTOLARYNGOLOGY

## 2025-05-10 PROCEDURE — 96365 THER/PROPH/DIAG IV INF INIT: CPT

## 2025-05-10 PROCEDURE — 96372 THER/PROPH/DIAG INJ SC/IM: CPT | Performed by: OTOLARYNGOLOGY

## 2025-05-10 PROCEDURE — 36600 WITHDRAWAL OF ARTERIAL BLOOD: CPT

## 2025-05-10 RX ORDER — METOPROLOL TARTRATE 1 MG/ML
5 INJECTION, SOLUTION INTRAVENOUS
Status: COMPLETED | OUTPATIENT
Start: 2025-05-10 | End: 2025-05-10

## 2025-05-10 RX ORDER — SODIUM CHLORIDE 9 MG/ML
1000 INJECTION, SOLUTION INTRAVENOUS
Status: COMPLETED | OUTPATIENT
Start: 2025-05-10 | End: 2025-05-10

## 2025-05-10 RX ORDER — LABETALOL HYDROCHLORIDE 5 MG/ML
10 INJECTION, SOLUTION INTRAVENOUS
Status: DISCONTINUED | OUTPATIENT
Start: 2025-05-10 | End: 2025-05-10

## 2025-05-10 RX ORDER — CEFTRIAXONE 1 G/1
1 INJECTION, POWDER, FOR SOLUTION INTRAMUSCULAR; INTRAVENOUS
Status: DISCONTINUED | OUTPATIENT
Start: 2025-05-10 | End: 2025-05-10

## 2025-05-10 RX ORDER — ENOXAPARIN SODIUM 100 MG/ML
40 INJECTION SUBCUTANEOUS
Status: COMPLETED | OUTPATIENT
Start: 2025-05-10 | End: 2025-05-10

## 2025-05-10 RX ORDER — CEFTRIAXONE 1 G/1
1 INJECTION, POWDER, FOR SOLUTION INTRAMUSCULAR; INTRAVENOUS
Status: COMPLETED | OUTPATIENT
Start: 2025-05-10 | End: 2025-05-10

## 2025-05-10 RX ORDER — DILTIAZEM HYDROCHLORIDE 5 MG/ML
5 INJECTION INTRAVENOUS
Status: COMPLETED | OUTPATIENT
Start: 2025-05-10 | End: 2025-05-10

## 2025-05-10 RX ORDER — IPRATROPIUM BROMIDE AND ALBUTEROL SULFATE 2.5; .5 MG/3ML; MG/3ML
3 SOLUTION RESPIRATORY (INHALATION)
Status: COMPLETED | OUTPATIENT
Start: 2025-05-10 | End: 2025-05-10

## 2025-05-10 RX ADMIN — CEFTRIAXONE SODIUM 1 G: 1 INJECTION, POWDER, FOR SOLUTION INTRAMUSCULAR; INTRAVENOUS at 11:05

## 2025-05-10 RX ADMIN — IPRATROPIUM BROMIDE AND ALBUTEROL SULFATE 3 ML: .5; 3 SOLUTION RESPIRATORY (INHALATION) at 10:05

## 2025-05-10 RX ADMIN — IOHEXOL 67 ML: 350 INJECTION, SOLUTION INTRAVENOUS at 11:05

## 2025-05-10 RX ADMIN — SODIUM CHLORIDE 1000 ML: 9 INJECTION, SOLUTION INTRAVENOUS at 09:05

## 2025-05-10 RX ADMIN — ENOXAPARIN SODIUM 40 MG: 40 INJECTION SUBCUTANEOUS at 11:05

## 2025-05-10 RX ADMIN — METOPROLOL TARTRATE 5 MG: 1 INJECTION, SOLUTION INTRAVENOUS at 09:05

## 2025-05-10 RX ADMIN — DILTIAZEM HYDROCHLORIDE 5 MG: 5 INJECTION INTRAVENOUS at 09:05

## 2025-05-10 RX ADMIN — AZITHROMYCIN DIHYDRATE 500 MG: 500 INJECTION, POWDER, LYOPHILIZED, FOR SOLUTION INTRAVENOUS at 11:05

## 2025-05-10 NOTE — ED PROVIDER NOTES
Encounter Date: 5/10/2025       History     Chief Complaint   Patient presents with    Shortness of Breath     PT to ER via [facility], PT C/O SOB with yellow and green sputum. Hx of COPD. On 3L via NC, solumedrol, and duoneb given in route.      THE PATIENT WAS BROUGHT IN BY EMS WITH HISTORY OF COPD AND SOB AND PRODUCTIVE COUGH OF GREEN SPUTUM STARTING ON MONDAY. SHE HAS PROGRESSIVELY WORSENED AND IS TACHYCARDIC AND WEAK. SHE DENIES CHEST PAIN. SHE DENIES INCREASED SWELLING OF HER LOWER EXTREMITIES. SHE HAS CHRONIC ATRIAL FIBRILLATION BUT IS NOT ANY ANY MEDICATIONS TO CONTROL HER RATE. SHE HAS HAD PNEUMONIA SEVERAL TIMES IN THE PAST SEVERAL YEARS AND HAS BEEN ADMITTED HERE AT THIS HOSPITAL. SHE LIVES ALONE AND HAS AGREED TO STAY FOR A FEW DAYS IF NEEDED BUT DOES NOT WANT TO GO TO A NURSING HOME. SHE CONTINUES TO SMOKE DAILY.        Review of patient's allergies indicates:  No Known Allergies  Past Medical History:   Diagnosis Date    COPD (chronic obstructive pulmonary disease)     Femur fracture, right     Paroxysmal atrial fibrillation      Past Surgical History:   Procedure Laterality Date    APPENDECTOMY      CHOLECYSTECTOMY      CORONARY ANGIOPLASTY WITH STENT PLACEMENT      HIATAL HERNIA REPAIR      HYSTERECTOMY      MASTECTOMY Bilateral      No family history on file.  Social History[1]  Review of Systems   Constitutional:  Positive for fatigue.   Respiratory:  Positive for cough, chest tightness, shortness of breath and wheezing.    Cardiovascular:  Negative for chest pain, palpitations and leg swelling.        TACHYCARDIA 160'S   Neurological:  Positive for weakness.   All other systems reviewed and are negative.      Physical Exam     Initial Vitals [05/10/25 0827]   BP Pulse Resp Temp SpO2   106/72 (!) 152 (!) 31 99.2 °F (37.3 °C) (!) 92 %      MAP       --         Physical Exam    Nursing note and vitals reviewed.  Constitutional: She appears well-developed and well-nourished. She appears distressed.    HENT:   Head: Normocephalic and atraumatic.   Eyes: Conjunctivae and EOM are normal. Pupils are equal, round, and reactive to light.   Neck: Neck supple.   Normal range of motion.  Cardiovascular:  Normal heart sounds.           ATRIAL FIBRILLATION AND TACHYCARDIA   Pulmonary/Chest: She has wheezes.   Abdominal: Abdomen is soft. Bowel sounds are normal.   Musculoskeletal:      Cervical back: Normal range of motion and neck supple.     Neurological: She is alert and oriented to person, place, and time. GCS score is 15. GCS eye subscore is 4. GCS verbal subscore is 5. GCS motor subscore is 6.   Skin: Skin is warm and dry.   Psychiatric: She has a normal mood and affect. Her behavior is normal. Judgment and thought content normal.         ED Course   Critical Care    Date/Time: 5/10/2025 8:25 AM    Performed by: Estela Louis MD  Authorized by: Estela Louis MD  Direct patient critical care time: 30 (30) minutes  Additional history critical care time: 30 minutes  Ordering / reviewing critical care time: 30 minutes  Documentation critical care time: 30 minutes  Consulting other physicians critical care time: 30 minutes  Consult with family critical care time: 15 minutes  Total critical care time (exclusive of procedural time) : 165 minutes  Critical care was necessary to treat or prevent imminent or life-threatening deterioration of the following conditions: cardiac failure and respiratory failure.  Critical care was time spent personally by me on the following activities: development of treatment plan with patient or surrogate, discussions with consultants, discussions with primary provider, interpretation of cardiac output measurements, evaluation of patient's response to treatment, examination of patient, obtaining history from patient or surrogate, ordering and performing treatments and interventions, ordering and review of laboratory studies, ordering and review of radiographic studies, pulse oximetry,  re-evaluation of patient's condition and review of old charts.        Labs Reviewed   COMPREHENSIVE METABOLIC PANEL - Abnormal       Result Value    Sodium 134 (*)     Potassium 4.5      Chloride 104      CO2 25      Glucose 124 (*)     Blood Urea Nitrogen 19      Creatinine 0.46 (*)     Calcium 9.6      Protein Total 6.5      Albumin 3.7      Globulin 2.8      Albumin/Globulin Ratio 1.3      Bilirubin Total 0.6            ALT 15      AST 22      eGFR 90      Anion Gap 5.0      BUN/Creatinine Ratio 41 (*)    NT-PRO NATRIURETIC PEPTIDE - Abnormal    ProBNP 2,670.0 (*)    D DIMER, QUANTITATIVE - Abnormal    D-Dimer 1.01 (*)    TROPONIN I - Abnormal    Troponin-I 0.072 (*)    BLOOD GAS - Abnormal    Sample Type Arterial Blood      pH, Blood gas 7.371      pCO2, Blood gas 44.6      pO2, Blood gas 104.0      CO Hgb 1.8 (*)     Met Hgb 0.0      sO2, Blood gas 99.0      HCO3, Blood gas 24.6      Base Excess, Blood gas 0.20      FIO2, Blood gas 34.0      LPM 3.5      Oxygen Device, Blood gas Cannula      IP 0.0      PIP 0     CBC WITH DIFFERENTIAL - Abnormal    WBC 8.86      RBC 3.78 (*)     Hgb 11.1 (*)     Hct 33.9 (*)     MCV 89.7      MCH 29.4      MCHC 32.7      RDW 13.5      Platelet 402 (*)     MPV 9.7      Neut % 80.5 (*)     Lymph % 5.9 (*)     Mono % 12.5      Eos % 0.2 (*)     Basophil % 0.6      Lymph # 0.52 (*)     Neut # 7.13 (*)     Mono # 1.11 (*)     Eos # 0.02 (*)     Baso # 0.05      Imm Gran # 0.03      Imm Grans % 0.3      NRBC% 0.0     BLOOD SMEAR MICROSCOPIC EXAM ([facility]) - Abnormal    RBC Morph Normal      Platelets Increased (*)    TROPONIN I - Abnormal    Troponin-I 0.130 (*)    TROPONIN I - Abnormal    Troponin-I 0.169 (*)    LACTIC ACID, PLASMA - Normal    Lactic Acid Level 1.1     COVID/RSV/FLU A&B PCR - Normal    Influenza A PCR Not Detected      Influenza B PCR Not Detected      Respiratory Syncytial Virus PCR Negative      SARS-CoV-2 PCR Negative      Narrative:     The Xpert Xpress  SARS-CoV-2/FLU/RSV plus is a rapid, multiplexed real-time PCR test intended for the simultaneous qualitative detection and differentiation of SARS-CoV-2, Influenza A, Influenza B, and respiratory syncytial virus (RSV) viral RNA in either nasopharyngeal swab or nasal swab specimens.         RESPIRATORY CULTURE ([facility])   BLOOD CULTURE [facility]   BLOOD CULTURE [facility]   CBC W/ AUTO DIFFERENTIAL    Narrative:     The following orders were created for panel order CBC Auto Differential.  Procedure                               Abnormality         Status                     ---------                               -----------         ------                     CBC with Differential[[id number]]       Abnormal            Final result                 Please view results for these tests on the individual orders.     EKG Readings: (Independently Interpreted)   Initial Reading: No STEMI. Rhythm: Atrial Fibrillation. Ectopy: Rare. Conduction: RBBB. ST Segments: Normal ST Segments. T Waves: Normal. Axis: Left Axis Deviation. Clinical Impression: Atrial Fibrillation       Imaging Results              CTA Chest Non-Coronary (PE Studies) (Final result)  Result time 05/10/25 11:47:38      Final result by Hector Xie MD (05/10/25 11:47:38)                   Impression:      1. No evidence of central or segmental pulmonary arterial filling defects to suggest central or segmental pulmonary embolus is visualized.  2. Small pericardial effusion is seen.  Cardiomegaly is noted.  3. There appear to be airspace opacities in the bilateral lower lobes adjacent to the pleural effusions which may be related to compressive atelectasis, but underlying infiltrate cannot be excluded.  There are multifocal reticulonodular opacities in the bilateral lungs which may reflect infectious/inflammatory process.  Follow-up with CT in 3 months is recommended.  4. There are more confluent streaky airspace opacities in the right middle lobe and lateral right lung base  which may be related to infectious/inflammatory process.  Attention on follow-up to ensure resolution is recommended.  5. Borderline subcarinal lymph node is seen along with additional small but somewhat prolific mediastinal lymph nodes.  Mildly enlarged right hilar lymph node is seen.  These lymph nodes are indeterminate, possibly reactive.  Recommend attention on follow-up.  6. Additional findings and details as above.      Electronically signed by: Hector Xie MD  Date:    05/10/2025  Time:    11:47               Narrative:    EXAMINATION:  CTA CHEST NON CORONARY (PE STUDIES)    CPT: 49972    CLINICAL HISTORY:  Pulmonary embolism (PE) suspected, positive D-dimer;.    TECHNIQUE:  Axial CTA slices through the chest obtained after the administration of intravenous contrast. Coronal and sagittal MIPS were created. Total DLP for the study is approximately 127.1 mGy-cm. Automated exposure control was utilized.    COMPARISON:  None    FINDINGS:  No evidence of central or segmental pulmonary arterial filling defects to suggest central or segmental pulmonary embolus is visualized.  The heart appears to be enlarged with right atrial predominance.  Atherosclerotic vascular calcifications are seen including scattered coronary and aortic calcifications.  Trace pericardial effusion is seen.  There appears to be borderline subcarinal lymph node measuring 9-10 mm in short axis dimension.  There are additional small but somewhat prolific mediastinal lymph nodes.  These lymph nodes are indeterminate, possibly reactive.  There appears to be a 12 mm in short axis dimension right hilar lymph node.  Subcentimeter in short axis dimension left hilar and bilateral axillary lymph nodes are noted.  Small right larger than left bilateral dependent pleural effusions are seen.  There appear to be airspace opacities in the adjacent bilateral lower lobes with volume loss which may reflect compressive atelectasis, but underlying infiltrate  cannot be excluded.  There are multifocal reticulonodular opacities in the bilateral lower lobes, right greater than left upper lobes, and right middle lobe which may reflect sequelae of infectious or inflammatory processes.  There is a more confluent area of streaky airspace disease with air bronchograms in the anterior right middle lobe and more confluent airspace disease in the lateral right lung base which may reflect infectious or inflammatory process.  Follow-up to ensure resolution is recommended.  There are streaky opacities in the bilateral lung bases which may reflect atelectasis.  No visible pneumothorax is appreciated.  The central airways appear grossly patent.  Calcified nodule in the right middle lobe is noted.  No evidence of pneumothorax is appreciated.  There appears to be evidence of moderate-sized Rissa off a gel hiatal hernia.  The included upper abdomen demonstrates no definite acute abnormalities.  Upper abdominal atherosclerosis is seen.  Small splenic calcification is seen which may reflect prior granulomatous disease.  The visualized osseous structures appear demineralized.  Degenerative changes affect the visualized spine.  Moderate superior endplate L1 compression fracture deformity of indeterminate chronicity is seen.  No obvious acute appearing fracture lucencies or paravertebral hematoma are appreciated at this time.                                       X-Ray Chest 1 View (Final result)  Result time 05/10/25 08:47:16      Final result by Chapo Golden MD (05/10/25 08:47:16)                   Impression:      Stable cardiomegaly is noted.    Bilateral basilar infiltrates and small effusions are now present, right greater than left.      Electronically signed by: Chapo Golden  Date:    05/10/2025  Time:    08:47               Narrative:    EXAMINATION:  XR CHEST 1 VIEW    CLINICAL HISTORY:  shortness of breath;    TECHNIQUE:  Single frontal view of the chest was  performed.    COMPARISON:  December 13, 2024    FINDINGS:  Heart size remains enlarged.  There has been interval development of bilateral infiltrates and small effusions, right greater than left.  No pneumothorax is seen.  Emphysematous changes are again noted.                                       Medications   diltiaZEM 100 mg in dextrose 5% 100 mL IVPB (ready to mix) (titrating) (0 mg/hr Intravenous Hold 5/10/25 1030)   albuterol-ipratropium 2.5 mg-0.5 mg/3 mL nebulizer solution 3 mL (3 mLs Nebulization Given 5/10/25 1028)   diltiaZEM injection 5 mg (5 mg Intravenous Given 5/10/25 0908)   0.9% NaCl infusion (0 mLs Intravenous Stopped 5/10/25 1158)   metoprolol injection 5 mg (5 mg Intravenous Given 5/10/25 0944)   diltiaZEM injection 5 mg (5 mg Intravenous Given 5/10/25 0925)   azithromycin (ZITHROMAX) 500 mg in 0.9% NaCl 250 mL IVPB (admixture device) (0 mg Intravenous Stopped 5/10/25 1235)   enoxaparin injection 40 mg (40 mg Subcutaneous Given 5/10/25 1123)   iohexoL (OMNIPAQUE 350) injection 93 mL (67 mLs Intravenous Given 5/10/25 1120)   cefTRIAXone injection 1 g (1 g Intravenous Given 5/10/25 1124)     Medical Decision Making  SOB/WEAKNESS/PRODUCTIVE COUGH. RULE OUT PNEUMONIA VS COPD WITH ACUTE EXACERBATION VS OTHER. WORK UP IN PROGRESS AND DR SKINNER AWARE. PATIENT HAS ELEVATED D-DIMER AND TROPONIN AND BNP IN 2000'S. SHE ALSO HAS BILATERAL INFILTRATES CONSISTENT WITH PNEUMONIA. STARTING IV ROCEPHIN AND ZITHROMAX AND REPEAT TROPONIN AND CTA ORDERED.    TROPONIN INCREASED AND CARDIOLOGY RECOMMENDED TRANSFER IF THE TROPONIN WENT ABOVE 1. TROPONIN HAS INCREASED. CTA NEG FOR EMBOLUS. PATIENT BEING EXCEPTED FOR TRANSFER AT [facility] BY DR ROLON.    Amount and/or Complexity of Data Reviewed  Labs: ordered. Decision-making details documented in ED Course.  Radiology: ordered.    Risk  Prescription drug management.  Decision regarding hospitalization.               ED Course as of 05/10/25 2473   Sat  May 10, 2025   1405 Troponin I(!): 0.169 [MM]      ED Course User Index  [MM] Estela Loius MD                           Clinical Impression:  Final diagnoses:  [R06.02] Shortness of breath  [R06.02] SOB (shortness of breath) (Primary)  [R06.00] Dyspnea, unspecified type  [I50.9] Congestive heart failure, unspecified HF chronicity, unspecified heart failure type  [R79.89] Elevated brain natriuretic peptide (BNP) level  [R79.89] D-dimer, elevated  [R79.89] Elevated troponin  [D64.9] Anemia, unspecified type  [E87.1] Hyponatremia  [J18.9] Pneumonia of both lungs due to infectious organism, unspecified part of lung  [J44.1] COPD with acute exacerbation          ED Disposition Condition    Transfer to Another Facility Stable                    [1]   Social History  Tobacco Use    Smoking status: Every Day     Types: Cigarettes    Smokeless tobacco: Never   Substance Use Topics    Alcohol use: Never    Drug use: Never        Estela Louis MD  05/10/25 1734

## 2025-05-10 NOTE — CONSULTS
[facility]  [facility]ult [facility]    Patient Name: Stephanie Gutierrez  MRN: [id number]  Admission Date: 5/10/2025  Hospital Length of Stay: 0 days  Code Status: Prior   Attending Provider: Dr. Estela Louis  Consulting Provider: Karen Jordan NP  Primary Care Physician: Sj Paige MD  Principal Problem:<principal problem not specified>    Patient information was obtained from patient and ER records.     Inpatient consult to Cardiology  Consult performed by: Karen Jordan NP  Consult ordered by: Estela Louis MD        Subjective:     Chief Complaint:  SOB, productive cough, malaise     HPI: 89 yo female who is unknown to [facility], sees Dr. Mahoney with history of CAD/PCI, PAF, COPD presents with c/o SOB, productive cough yellow and green sputum, difficulty breathing, CP with deep inspiration, no N/V, peripheral edema or unintentional weight gain. Work up in ER reveals mildly elevated troponin, EKG afib RVR, RBBB, elevated Ddimer, mild anemia, bilateral infiltrates with small pleural effusions. Last week she was scheduled for myocardial perfusion study but could not complete because she could not raise her arms above her head. [facility] consulted by Dr. Louis for afib.    Past Medical History:   Diagnosis Date    COPD (chronic obstructive pulmonary disease)     Femur fracture, right     Paroxysmal atrial fibrillation    CAD/PCI    Past Surgical History:   Procedure Laterality Date    APPENDECTOMY      CHOLECYSTECTOMY      CORONARY ANGIOPLASTY WITH STENT PLACEMENT      HIATAL HERNIA REPAIR      HYSTERECTOMY      MASTECTOMY Bilateral        Review of patient's allergies indicates:  No Known Allergies    No current facility-administered medications on file prior to encounter.     Current Outpatient Medications on File Prior to Encounter   Medication Sig    aspirin (ECOTRIN) 81 MG EC tablet Take 81 mg by mouth once daily.    clopidogreL (PLAVIX) 75 mg tablet Take 75 mg by mouth once  daily.    flecainide (TAMBOCOR) 50 MG Tab Take 50 mg by mouth every 12 (twelve) hours.    furosemide (LASIX) 20 MG tablet Take 20 mg by mouth 2 (two) times daily.    iron,carb/vit C/vit B12/folic (IRON 100 PLUS ORAL) Take 1 tablet by mouth once daily.    isosorbide mononitrate (IMDUR) 30 MG 24 hr tablet Take 30 mg by mouth once daily.    pantoprazole (PROTONIX) 40 MG tablet Take 40 mg by mouth once daily.    potassium chloride SA (K-DUR,KLOR-CON) 20 MEQ tablet Take 20 mEq by mouth once daily.    theophylline (CAROLINE-24) 100 MG 24 hr capsule Take 100 mg by mouth once daily.    [DISCONTINUED] acetaminophen (TYLENOL) 500 MG tablet Take 2 tablets (1,000 mg total) by mouth every 6 (six) hours as needed.    [DISCONTINUED] HYDROcodone-acetaminophen (NORCO) 7.5-325 mg per tablet Take 1 tablet by mouth every 6 (six) hours as needed.     Family History    None       Tobacco Use    Smoking status: Every Day     Types: Cigarettes    Smokeless tobacco: Never   Substance and Sexual Activity    Alcohol use: Never    Drug use: Never    Sexual activity: Not on file     Review of Systems   Constitutional: Negative.    HENT: Negative.     Eyes: Negative.    Respiratory:  Positive for cough (productive yellow and green sputum) and shortness of breath.    Cardiovascular:  Positive for chest pain (with deep breaths).   Gastrointestinal: Negative.    Endocrine: Negative.    Genitourinary: Negative.    Musculoskeletal: Negative.    Skin: Negative.    Allergic/Immunologic: Negative.    Neurological: Negative.    Hematological: Negative.    Psychiatric/Behavioral: Negative.       Objective:     Vital Signs (Most Recent):  Temp: 98.5 °F (36.9 °C) (05/10/25 1026)  Pulse: 88 (05/10/25 1028)  Resp: (!) 22 (05/10/25 1028)  BP: (!) 110/54 (05/10/25 1028)  SpO2: 97 % (05/10/25 1028) Vital Signs (24h Range):  Temp:  [98.5 °F (36.9 °C)-99.2 °F (37.3 °C)] 98.5 °F (36.9 °C)  Pulse:  [] 88  Resp:  [22-47] 22  SpO2:  [92 %-100 %] 97 %  BP:  ()/(51-74) 110/54     Weight: 42.2 kg (93 lb)  Body mass index is 17.57 kg/m².    SpO2: 97 %       No intake or output data in the 24 hours ending 05/10/25 1057    Lines/Drains/Airways       Peripheral Intravenous Line  Duration                  Peripheral IV - Single Lumen 05/10/25 0855 20 G Right Antecubital <1 day                        Significant Labs:   Recent Lab Results         05/10/25  0859   05/10/25  0858   05/10/25  0851        RSV Ag by Molecular Method   Negative         Influenza A, Molecular   Not Detected         Influenza B, Molecular   Not Detected         Albumin/Globulin Ratio 1.3           Albumin 3.7                      ALT 15           Anion Gap 5.0           AST 22           Baso # 0.05           Basophil % 0.6           BILIRUBIN TOTAL 0.6           BUN 19           BUN/CREAT RATIO 41           Calcium 9.6           Chloride 104           CO2 25           Creatinine 0.46           D-Dimer 1.01  Comment: D-dimer values of less than 0.5ug/mL (mg/L) FEU in adult patients with a clinically low pre-test probability of developing a DVT yield  a 99% negative predictive value.  D-dimer increases naturally with age, therefore the negative predictive value in patients older than 80 is 21 - 31%.    D-Dimer testing is used as an aid in the diagnosis of VTE/PE. The D-Dimer test must be used with one or more additional tests such as imaging studies to evaluate VTE/PE           eGFR 90  Comment:                      EGFR INTERPRETATION    Beginning 8/15/22 we are reporting the eGFRcr calculation as recommended by the [employer]. The eGFRcr equation has similar overall performance characteristics to the older equation, but the values may differ by more than 10% particularly at higher values of eGFRcr and younger adult ages.    NKF stages of chronic kidney disease (CKD)  Stage 1: Kidney damage with normal or increased eGFR (>90 mL/min/1.73 m^2)  Stage 2: Mild reduction in GFR  "(60-89 mL/min/1.73 m^2)  Stage 3a: Moderate reduction in GFR (45-59 mL/min/1.73 m^2)  Stage 3b: Moderate reduction in GFR (30-44 mL/min/1.73 m^2)  Stage 4: Severe reduction in GFR (15-29 mL/min/1.73 m^2)  Stage 5: Kidney failure (GFR <15 mL/min/1.73 m^2)      Estimated GFR calculated using the CKD-EPI creatinine (2021) equation.           Eos # 0.02           Eos % 0.2           FIO2, Blood gas     34.0       Globulin, Total 2.8           Glucose 124           Hematocrit 33.9           Hemoglobin 11.1           Immature Grans (Abs) 0.03           Immature Granulocytes 0.3           IP     0.0       Lactic Acid Level 1.1           LPM     3.5       Lymph # 0.52           LYMPH % 5.9           MCH 29.4           MCHC 32.7           MCV 89.7           Mono # 1.11           Mono % 12.5           MPV 9.7           Neut # 7.13           Neut % 80.5           nRBC 0.0           Oxygen Device, Blood gas     Cannula       PIP     0       Platelet Estimate Increased           Platelet Count 402           Base Excess, Blood gas     0.20       CO Hgb     1.8       POC HCO3     24.6       Met Hgb     0.0       POC PCO2     44.6       POC PH     7.371       POC PO2     104.0       Potassium 4.5           ProBNP 2,670.0  Comment:   For ambulatory patients presenting to outpatient facilities with clinical suspicion of HF not previously diagnosed and at least one sign, symptom or risk factor for HF, NT-proBNP II test results should be interpreted as indicated below:    <125(pg/mL):"Negative: Heart Failure Unlikely"    >=125(pg/mL):"Consider Heart Failure as well as other causes of NT-proBNP elevation"                 PROTEIN TOTAL 6.5           RBC 3.78           RBC Morph Normal           RDW 13.5           Sample Type     Arterial Blood       SARS-CoV2 (COVID-19) Qualitative PCR   Negative         sO2, Blood gas     99.0       Sodium 134           Troponin I 0.072           WBC 8.86                   Significant Imaging: EKG: afib " RVR  Repeat EKG SR, RBBB    CXR:  EXAMINATION:  XR CHEST 1 VIEW     CLINICAL HISTORY:  shortness of breath;     TECHNIQUE:  Single frontal view of the chest was performed.     COMPARISON:  December 13, 2024     FINDINGS:  Heart size remains enlarged.  There has been interval development of bilateral infiltrates and small effusions, right greater than left.  No pneumothorax is seen.  Emphysematous changes are again noted.     Impression:     Stable cardiomegaly is noted.     Bilateral basilar infiltrates and small effusions are now present, right greater than left.        Electronically signed by:Chapo Golden  Date:                                            05/10/2025  Time:                                           08:47          CTA chest pending      Physical Exam  Vitals reviewed.   Constitutional:       Appearance: Normal appearance. She is normal weight.   HENT:      Head: Normocephalic and atraumatic.      Nose: Nose normal.   Eyes:      Extraocular Movements: Extraocular movements intact.   Cardiovascular:      Rate and Rhythm: Tachycardia present. Rhythm irregular.      Heart sounds: Normal heart sounds.   Pulmonary:      Breath sounds: Wheezing present.   Neurological:      General: No focal deficit present.      Mental Status: She is alert and oriented to person, place, and time.           Current Inpatient Medications:  Current Medications[1]    Current Outpatient Medications:     aspirin (ECOTRIN) 81 MG EC tablet, Take 81 mg by mouth once daily., Disp: , Rfl:     clopidogreL (PLAVIX) 75 mg tablet, Take 75 mg by mouth once daily., Disp: , Rfl:     flecainide (TAMBOCOR) 50 MG Tab, Take 50 mg by mouth every 12 (twelve) hours., Disp: , Rfl:     furosemide (LASIX) 20 MG tablet, Take 20 mg by mouth 2 (two) times daily., Disp: , Rfl:     iron,carb/vit C/vit B12/folic (IRON 100 PLUS ORAL), Take 1 tablet by mouth once daily., Disp: , Rfl:     isosorbide mononitrate (IMDUR) 30 MG 24 hr tablet, Take 30 mg by  mouth once daily., Disp: , Rfl:     pantoprazole (PROTONIX) 40 MG tablet, Take 40 mg by mouth once daily., Disp: , Rfl:     potassium chloride SA (K-DUR,KLOR-CON) 20 MEQ tablet, Take 20 mEq by mouth once daily., Disp: , Rfl:     theophylline (CAROLINE-24) 100 MG 24 hr capsule, Take 100 mg by mouth once daily., Disp: , Rfl:  VTE Risk Mitigation (From admission, onward)           Ordered     enoxaparin injection 40 mg  ED 1 Time         05/10/25 1047                  Assessment :   NSTEMI type II  Afib RVR/PAF  CAD/PCI  Pneumonia  COPD        Plan:     Afib RVR improved with diltiazem, lopressor. Recommend resuming flecainide at home dose, use prn lopressor 5 mg IV q3hrs for HR >120 bpm, FD lovenox BID and consider NOAC prior to discharge. She denies any history of GIB or other bleeding episodes. Monitor on telemetry. Obtain echocardiogram.  Trend troponin until peak. Suspect type II MI secondary to demand. However, if substantial rise in troponin, would transfer to facility with cardiology for further evaluation and treatment. Continue ASA, plavix. If NOAC is started prior to discharge, recommend discontinuing ASA to avoid triple therapy.  She will need follow up with her  cardiologist after discharge from the hospital.    Thank you for allowing me to participate in this patient's care. Call with questions.        Karen Jordan NP  Cardiology  [facility]  05/10/2025 10:57 AM            [1]   Current Facility-Administered Medications:     azithromycin (ZITHROMAX) 500 mg in 0.9% NaCl 250 mL IVPB (admixture device), 500 mg, Intravenous, ED 1 Time, Estela Louis MD    cefTRIAXone injection 1 g, 1 g, Intramuscular, ED 1 Time, Estela Louis MD    diltiaZEM 100 mg in dextrose 5% 100 mL IVPB (ready to mix) (titrating), 0-15 mg/hr, Intravenous, Continuous, Estela Louis MD, Held at 05/10/25 1030    enoxaparin injection 40 mg, 40 mg, Subcutaneous, ED 1 Time, Estela Louis MD    iohexoL  (OMNIPAQUE 350) injection 93 mL, 93 mL, Intravenous, ONCE PRN, Estela Louis MD    Current Outpatient Medications:     aspirin (ECOTRIN) 81 MG EC tablet, Take 81 mg by mouth once daily., Disp: , Rfl:     clopidogreL (PLAVIX) 75 mg tablet, Take 75 mg by mouth once daily., Disp: , Rfl:     flecainide (TAMBOCOR) 50 MG Tab, Take 50 mg by mouth every 12 (twelve) hours., Disp: , Rfl:     furosemide (LASIX) 20 MG tablet, Take 20 mg by mouth 2 (two) times daily., Disp: , Rfl:     iron,carb/vit C/vit B12/folic (IRON 100 PLUS ORAL), Take 1 tablet by mouth once daily., Disp: , Rfl:     isosorbide mononitrate (IMDUR) 30 MG 24 hr tablet, Take 30 mg by mouth once daily., Disp: , Rfl:     pantoprazole (PROTONIX) 40 MG tablet, Take 40 mg by mouth once daily., Disp: , Rfl:     potassium chloride SA (K-DUR,KLOR-CON) 20 MEQ tablet, Take 20 mEq by mouth once daily., Disp: , Rfl:     theophylline (CAROLINE-24) 100 MG 24 hr capsule, Take 100 mg by mouth once daily., Disp: , Rfl:

## 2025-05-11 ENCOUNTER — HOSPITAL ENCOUNTER (INPATIENT)
Facility: HOSPITAL | Age: OVER 89
LOS: 2 days | Discharge: HOME HEALTH | DRG: 308 | End: 2025-05-13
Attending: INTERNAL MEDICINE | Admitting: INTERNAL MEDICINE
Payer: MEDICARE

## 2025-05-11 VITALS
RESPIRATION RATE: 33 BRPM | SYSTOLIC BLOOD PRESSURE: 129 MMHG | HEART RATE: 67 BPM | OXYGEN SATURATION: 89 % | WEIGHT: 93 LBS | BODY MASS INDEX: 17.56 KG/M2 | DIASTOLIC BLOOD PRESSURE: 57 MMHG | TEMPERATURE: 98 F | HEIGHT: 61 IN

## 2025-05-11 DIAGNOSIS — I48.91 ATRIAL FIBRILLATION: ICD-10-CM

## 2025-05-11 DIAGNOSIS — I48.91 AFIB: ICD-10-CM

## 2025-05-11 DIAGNOSIS — R06.02 SOB (SHORTNESS OF BREATH): ICD-10-CM

## 2025-05-11 DIAGNOSIS — R07.9 CHEST PAIN: ICD-10-CM

## 2025-05-11 PROBLEM — F17.200 TOBACCO DEPENDENCY: Status: ACTIVE | Noted: 2025-05-11

## 2025-05-11 LAB
ALBUMIN SERPL-MCNC: 2.8 G/DL (ref 3.4–4.8)
ALBUMIN/GLOB SERPL: 1 RATIO (ref 1.1–2)
ALP SERPL-CCNC: 150 UNIT/L (ref 40–150)
ALT SERPL-CCNC: 30 UNIT/L (ref 0–55)
ANION GAP SERPL CALC-SCNC: 7 MEQ/L
APICAL FOUR CHAMBER EJECTION FRACTION: 66 %
APICAL TWO CHAMBER EJECTION FRACTION: 68 %
AST SERPL-CCNC: 22 UNIT/L (ref 11–45)
AV INDEX (PROSTH): 0.39
AV MEAN GRADIENT: 28 MMHG
AV PEAK GRADIENT: 52 MMHG
AV VALVE AREA BY VELOCITY RATIO: 1.1 CM²
AV VALVE AREA: 1.1 CM²
AV VELOCITY RATIO: 0.39
BASOPHILS # BLD AUTO: 0.01 X10(3)/MCL
BASOPHILS NFR BLD AUTO: 0.1 %
BILIRUB SERPL-MCNC: 0.2 MG/DL
BNP BLD-MCNC: 724.9 PG/ML
BUN SERPL-MCNC: 22.5 MG/DL (ref 9.8–20.1)
CALCIUM SERPL-MCNC: 9.2 MG/DL (ref 8.4–10.2)
CHLORIDE SERPL-SCNC: 106 MMOL/L (ref 98–111)
CO2 SERPL-SCNC: 27 MMOL/L (ref 23–31)
CREAT SERPL-MCNC: 0.6 MG/DL (ref 0.55–1.02)
CREAT/UREA NIT SERPL: 38
CV ECHO LV RWT: 0.63 CM
DOP CALC AO PEAK VEL: 3.6 M/S
DOP CALC AO VTI: 78.2 CM
DOP CALC LVOT AREA: 2.8 CM2
DOP CALC LVOT DIAMETER: 1.9 CM
DOP CALC LVOT PEAK VEL: 1.4 M/S
DOP CALC LVOT STROKE VOLUME: 86.1 CM3
DOP CALC MV VTI: 41.8 CM
DOP CALCLVOT PEAK VEL VTI: 30.4 CM
E WAVE DECELERATION TIME: 261 MSEC
E/A RATIO: 0.75
E/E' RATIO: 10 M/S
ECHO LV POSTERIOR WALL: 1.1 CM (ref 0.6–1.1)
EOSINOPHIL # BLD AUTO: 0 X10(3)/MCL (ref 0–0.9)
EOSINOPHIL NFR BLD AUTO: 0 %
ERYTHROCYTE [DISTWIDTH] IN BLOOD BY AUTOMATED COUNT: 13.9 % (ref 11.5–17)
FLUAV AG UPPER RESP QL IA.RAPID: NOT DETECTED
FLUBV AG UPPER RESP QL IA.RAPID: NOT DETECTED
FRACTIONAL SHORTENING: 34.3 % (ref 28–44)
GFR SERPLBLD CREATININE-BSD FMLA CKD-EPI: >60 ML/MIN/1.73/M2
GLOBULIN SER-MCNC: 2.9 GM/DL (ref 2.4–3.5)
GLUCOSE SERPL-MCNC: 120 MG/DL (ref 75–121)
HCT VFR BLD AUTO: 35.6 % (ref 37–47)
HGB BLD-MCNC: 10.9 G/DL (ref 12–16)
HR MV ECHO: 69 BPM
IMM GRANULOCYTES # BLD AUTO: 0.03 X10(3)/MCL (ref 0–0.04)
IMM GRANULOCYTES NFR BLD AUTO: 0.4 %
INTERVENTRICULAR SEPTUM: 0.9 CM (ref 0.6–1.1)
LEFT ATRIUM AREA SYSTOLIC (APICAL 2 CHAMBER): 19.9 CM2
LEFT ATRIUM AREA SYSTOLIC (APICAL 4 CHAMBER): 19.2 CM2
LEFT ATRIUM SIZE: 3.9 CM
LEFT ATRIUM VOLUME MOD: 56 ML
LEFT INTERNAL DIMENSION IN SYSTOLE: 2.3 CM (ref 2.1–4)
LEFT VENTRICLE DIASTOLIC VOLUME: 51 ML
LEFT VENTRICLE END DIASTOLIC VOLUME APICAL 2 CHAMBER: 69.5 ML
LEFT VENTRICLE END DIASTOLIC VOLUME APICAL 4 CHAMBER: 68.6 ML
LEFT VENTRICLE END SYSTOLIC VOLUME APICAL 2 CHAMBER: 61.4 ML
LEFT VENTRICLE END SYSTOLIC VOLUME APICAL 4 CHAMBER: 50 ML
LEFT VENTRICLE SYSTOLIC VOLUME: 18 ML
LEFT VENTRICULAR INTERNAL DIMENSION IN DIASTOLE: 3.5 CM (ref 3.5–6)
LEFT VENTRICULAR MASS: 103.4 G
LV LATERAL E/E' RATIO: 11.8 M/S
LV SEPTAL E/E' RATIO: 9.4 M/S
LVED V (TEICH): 50.9 ML
LVES V (TEICH): 18.1 ML
LVOT MG: 4 MMHG
LVOT MV: 0.91 CM/S
LYMPHOCYTES # BLD AUTO: 0.33 X10(3)/MCL (ref 0.6–4.6)
LYMPHOCYTES NFR BLD AUTO: 4.9 %
MAGNESIUM SERPL-MCNC: 2.4 MG/DL (ref 1.6–2.6)
MCH RBC QN AUTO: 28.8 PG (ref 27–31)
MCHC RBC AUTO-ENTMCNC: 30.6 G/DL (ref 33–36)
MCV RBC AUTO: 93.9 FL (ref 80–94)
MONOCYTES # BLD AUTO: 0.6 X10(3)/MCL (ref 0.1–1.3)
MONOCYTES NFR BLD AUTO: 8.8 %
MV MEAN GRADIENT: 4 MMHG
MV PEAK A VEL: 1.26 M/S
MV PEAK E VEL: 0.94 M/S
MV PEAK GRADIENT: 8 MMHG
MV STENOSIS PRESSURE HALF TIME: 78 MS
MV VALVE AREA BY CONTINUITY EQUATION: 2.06 CM2
MV VALVE AREA P 1/2 METHOD: 2.82 CM2
NEUTROPHILS # BLD AUTO: 5.83 X10(3)/MCL (ref 2.1–9.2)
NEUTROPHILS NFR BLD AUTO: 85.8 %
NRBC BLD AUTO-RTO: 0 %
OHS LV EJECTION FRACTION SIMPSONS BIPLANE MOD: 67 %
PISA RADIUS: 0.7 CM
PISA TR MAX VEL: 3.3 M/S
PISA VN NYQUIST MS: 0.31 M/S
PISA VN NYQUIST: 0.31 M/S
PLATELET # BLD AUTO: 377 X10(3)/MCL (ref 130–400)
PMV BLD AUTO: 9.6 FL (ref 7.4–10.4)
POTASSIUM SERPL-SCNC: 4.7 MMOL/L (ref 3.5–5.1)
PROT SERPL-MCNC: 5.7 GM/DL (ref 5.8–7.6)
RA PRESSURE ESTIMATED: 8 MMHG
RBC # BLD AUTO: 3.79 X10(6)/MCL (ref 4.2–5.4)
RSV A 5' UTR RNA NPH QL NAA+PROBE: NOT DETECTED
RV TB RVSP: 11 MMHG
SARS-COV-2 RNA RESP QL NAA+PROBE: NOT DETECTED
SODIUM SERPL-SCNC: 140 MMOL/L (ref 136–145)
TDI LATERAL: 0.08 M/S
TDI SEPTAL: 0.1 M/S
TDI: 0.09 M/S
TR MAX PG: 44 MMHG
TRICUSPID ANNULAR PLANE SYSTOLIC EXCURSION: 1.9 CM
TROPONIN I SERPL-MCNC: 0.05 NG/ML (ref 0–0.04)
TROPONIN I SERPL-MCNC: 0.06 NG/ML (ref 0–0.04)
TROPONIN I SERPL-MCNC: 0.09 NG/ML (ref 0–0.04)
TV REST PULMONARY ARTERY PRESSURE: 52 MMHG
WBC # BLD AUTO: 6.8 X10(3)/MCL (ref 4.5–11.5)

## 2025-05-11 PROCEDURE — 0241U COVID/RSV/FLU A&B PCR: CPT | Performed by: NURSE PRACTITIONER

## 2025-05-11 PROCEDURE — 83880 ASSAY OF NATRIURETIC PEPTIDE: CPT | Performed by: NURSE PRACTITIONER

## 2025-05-11 PROCEDURE — S4991 NICOTINE PATCH NONLEGEND: HCPCS | Performed by: HOSPITALIST

## 2025-05-11 PROCEDURE — 21400001 HC TELEMETRY ROOM

## 2025-05-11 PROCEDURE — 63600175 PHARM REV CODE 636 W HCPCS: Performed by: HOSPITALIST

## 2025-05-11 PROCEDURE — 83735 ASSAY OF MAGNESIUM: CPT | Performed by: NURSE PRACTITIONER

## 2025-05-11 PROCEDURE — 25000003 PHARM REV CODE 250: Performed by: HOSPITALIST

## 2025-05-11 PROCEDURE — 85025 COMPLETE CBC W/AUTO DIFF WBC: CPT | Performed by: NURSE PRACTITIONER

## 2025-05-11 PROCEDURE — 25000003 PHARM REV CODE 250: Performed by: NURSE PRACTITIONER

## 2025-05-11 PROCEDURE — 63600175 PHARM REV CODE 636 W HCPCS: Performed by: NURSE PRACTITIONER

## 2025-05-11 PROCEDURE — 80053 COMPREHEN METABOLIC PANEL: CPT | Performed by: NURSE PRACTITIONER

## 2025-05-11 PROCEDURE — 36415 COLL VENOUS BLD VENIPUNCTURE: CPT | Performed by: NURSE PRACTITIONER

## 2025-05-11 PROCEDURE — 84484 ASSAY OF TROPONIN QUANT: CPT | Performed by: NURSE PRACTITIONER

## 2025-05-11 RX ORDER — ENOXAPARIN SODIUM 100 MG/ML
1 INJECTION SUBCUTANEOUS EVERY 12 HOURS
Status: DISCONTINUED | OUTPATIENT
Start: 2025-05-11 | End: 2025-05-13 | Stop reason: HOSPADM

## 2025-05-11 RX ORDER — SODIUM CHLORIDE 0.9 % (FLUSH) 0.9 %
10 SYRINGE (ML) INJECTION
Status: DISCONTINUED | OUTPATIENT
Start: 2025-05-11 | End: 2025-05-13 | Stop reason: HOSPADM

## 2025-05-11 RX ORDER — PANTOPRAZOLE SODIUM 40 MG/1
40 TABLET, DELAYED RELEASE ORAL DAILY
Status: DISCONTINUED | OUTPATIENT
Start: 2025-05-11 | End: 2025-05-13 | Stop reason: HOSPADM

## 2025-05-11 RX ORDER — ONDANSETRON HYDROCHLORIDE 2 MG/ML
4 INJECTION, SOLUTION INTRAVENOUS EVERY 4 HOURS PRN
Status: DISCONTINUED | OUTPATIENT
Start: 2025-05-11 | End: 2025-05-13 | Stop reason: HOSPADM

## 2025-05-11 RX ORDER — IBUPROFEN 200 MG
16 TABLET ORAL
Status: DISCONTINUED | OUTPATIENT
Start: 2025-05-11 | End: 2025-05-13 | Stop reason: HOSPADM

## 2025-05-11 RX ORDER — ASPIRIN 81 MG/1
81 TABLET ORAL DAILY
Status: DISCONTINUED | OUTPATIENT
Start: 2025-05-11 | End: 2025-05-13 | Stop reason: HOSPADM

## 2025-05-11 RX ORDER — ALUMINUM HYDROXIDE, MAGNESIUM HYDROXIDE, AND SIMETHICONE 1200; 120; 1200 MG/30ML; MG/30ML; MG/30ML
30 SUSPENSION ORAL 4 TIMES DAILY PRN
Status: DISCONTINUED | OUTPATIENT
Start: 2025-05-11 | End: 2025-05-13 | Stop reason: HOSPADM

## 2025-05-11 RX ORDER — ACETAMINOPHEN 500 MG
1000 TABLET ORAL EVERY 6 HOURS PRN
Status: DISCONTINUED | OUTPATIENT
Start: 2025-05-11 | End: 2025-05-13 | Stop reason: HOSPADM

## 2025-05-11 RX ORDER — FLECAINIDE ACETATE 50 MG/1
50 TABLET ORAL EVERY 12 HOURS
Status: DISCONTINUED | OUTPATIENT
Start: 2025-05-11 | End: 2025-05-13 | Stop reason: HOSPADM

## 2025-05-11 RX ORDER — CEFTRIAXONE 1 G/1
1 INJECTION, POWDER, FOR SOLUTION INTRAMUSCULAR; INTRAVENOUS
Status: DISCONTINUED | OUTPATIENT
Start: 2025-05-11 | End: 2025-05-11

## 2025-05-11 RX ORDER — NALOXONE HCL 0.4 MG/ML
0.02 VIAL (ML) INJECTION
Status: DISCONTINUED | OUTPATIENT
Start: 2025-05-11 | End: 2025-05-13 | Stop reason: HOSPADM

## 2025-05-11 RX ORDER — ISOSORBIDE MONONITRATE 30 MG/1
30 TABLET, EXTENDED RELEASE ORAL DAILY
Status: DISCONTINUED | OUTPATIENT
Start: 2025-05-11 | End: 2025-05-13 | Stop reason: HOSPADM

## 2025-05-11 RX ORDER — POTASSIUM CHLORIDE 20 MEQ/1
20 TABLET, EXTENDED RELEASE ORAL DAILY
Status: DISCONTINUED | OUTPATIENT
Start: 2025-05-11 | End: 2025-05-13 | Stop reason: HOSPADM

## 2025-05-11 RX ORDER — AMOXICILLIN 250 MG
1 CAPSULE ORAL 2 TIMES DAILY PRN
Status: DISCONTINUED | OUTPATIENT
Start: 2025-05-11 | End: 2025-05-13 | Stop reason: HOSPADM

## 2025-05-11 RX ORDER — GLUCAGON 1 MG
1 KIT INJECTION
Status: DISCONTINUED | OUTPATIENT
Start: 2025-05-11 | End: 2025-05-13 | Stop reason: HOSPADM

## 2025-05-11 RX ORDER — MUPIROCIN 20 MG/G
OINTMENT TOPICAL 2 TIMES DAILY
Status: DISCONTINUED | OUTPATIENT
Start: 2025-05-12 | End: 2025-05-13 | Stop reason: HOSPADM

## 2025-05-11 RX ORDER — TALC
6 POWDER (GRAM) TOPICAL NIGHTLY PRN
Status: DISCONTINUED | OUTPATIENT
Start: 2025-05-11 | End: 2025-05-13 | Stop reason: HOSPADM

## 2025-05-11 RX ORDER — FUROSEMIDE 20 MG/1
20 TABLET ORAL 2 TIMES DAILY
Status: DISCONTINUED | OUTPATIENT
Start: 2025-05-11 | End: 2025-05-13 | Stop reason: HOSPADM

## 2025-05-11 RX ORDER — IBUPROFEN 200 MG
1 TABLET ORAL DAILY
Status: DISCONTINUED | OUTPATIENT
Start: 2025-05-11 | End: 2025-05-13 | Stop reason: HOSPADM

## 2025-05-11 RX ORDER — IBUPROFEN 200 MG
24 TABLET ORAL
Status: DISCONTINUED | OUTPATIENT
Start: 2025-05-11 | End: 2025-05-13 | Stop reason: HOSPADM

## 2025-05-11 RX ORDER — PROCHLORPERAZINE EDISYLATE 5 MG/ML
5 INJECTION INTRAMUSCULAR; INTRAVENOUS EVERY 6 HOURS PRN
Status: DISCONTINUED | OUTPATIENT
Start: 2025-05-11 | End: 2025-05-13 | Stop reason: HOSPADM

## 2025-05-11 RX ORDER — FLECAINIDE ACETATE 50 MG/1
50 TABLET ORAL EVERY 12 HOURS
Status: DISCONTINUED | OUTPATIENT
Start: 2025-05-11 | End: 2025-05-11

## 2025-05-11 RX ORDER — BISACODYL 10 MG/1
10 SUPPOSITORY RECTAL DAILY PRN
Status: DISCONTINUED | OUTPATIENT
Start: 2025-05-11 | End: 2025-05-13 | Stop reason: HOSPADM

## 2025-05-11 RX ORDER — PREDNISONE 20 MG/1
20 TABLET ORAL 2 TIMES DAILY
Status: DISCONTINUED | OUTPATIENT
Start: 2025-05-11 | End: 2025-05-13 | Stop reason: HOSPADM

## 2025-05-11 RX ORDER — CLOPIDOGREL BISULFATE 75 MG/1
75 TABLET ORAL DAILY
Status: DISCONTINUED | OUTPATIENT
Start: 2025-05-11 | End: 2025-05-13 | Stop reason: HOSPADM

## 2025-05-11 RX ADMIN — FUROSEMIDE 20 MG: 20 TABLET ORAL at 09:05

## 2025-05-11 RX ADMIN — FUROSEMIDE 20 MG: 20 TABLET ORAL at 08:05

## 2025-05-11 RX ADMIN — ASPIRIN 81 MG: 81 TABLET, COATED ORAL at 09:05

## 2025-05-11 RX ADMIN — ENOXAPARIN SODIUM 40 MG: 40 INJECTION SUBCUTANEOUS at 03:05

## 2025-05-11 RX ADMIN — CLOPIDOGREL 75 MG: 75 TABLET ORAL at 09:05

## 2025-05-11 RX ADMIN — PREDNISONE 20 MG: 20 TABLET ORAL at 08:05

## 2025-05-11 RX ADMIN — NICOTINE 1 PATCH: 14 PATCH TRANSDERMAL at 09:05

## 2025-05-11 RX ADMIN — FLECAINIDE ACETATE 50 MG: 50 TABLET ORAL at 08:05

## 2025-05-11 RX ADMIN — LEVOFLOXACIN 750 MG: 500 TABLET, FILM COATED ORAL at 09:05

## 2025-05-11 RX ADMIN — PANTOPRAZOLE SODIUM 40 MG: 40 TABLET, DELAYED RELEASE ORAL at 09:05

## 2025-05-11 RX ADMIN — THEOPHYLLINE ANHYDROUS 100 MG: 100 CAPSULE, EXTENDED RELEASE ORAL at 09:05

## 2025-05-11 RX ADMIN — POTASSIUM CHLORIDE 20 MEQ: 1500 TABLET, EXTENDED RELEASE ORAL at 09:05

## 2025-05-11 RX ADMIN — ISOSORBIDE MONONITRATE 30 MG: 30 TABLET, EXTENDED RELEASE ORAL at 09:05

## 2025-05-11 RX ADMIN — PREDNISONE 20 MG: 20 TABLET ORAL at 09:05

## 2025-05-11 NOTE — H&P
[facility]  Hospital Medicine History & Physical Examination       Patient Name: Stephanie Gutierrez  MRN: [id number]  Patient Class: IP- Inpatient   Admission Date: 5/11/2025   Admitting Physician:  Service   Attending Physician: Wilber Blandon MD  Primary Care Provider: Sj Paige MD  Face-to-Face encounter date: 05/11/2025  Code Status:Code Status Discussion Note   Chief Complaint: No chief complaint on file.         Patient information was obtained from patient, patient's family, past medical records and ER records.     HISTORY OF PRESENT ILLNESS:   Stephanie Gutierrez is a 89 y.o. female who  has a past medical history of COPD (chronic obstructive pulmonary disease), Femur fracture, right, and Paroxysmal atrial fibrillation.. The patient presented to [facility] on 5/11/2025     89-year-old female with past medical history of CAD, atrial fibrillation, COPD, tobacco abuse presents to the emergency room at an outside facility secondary to productive cough and sputum.  Also endorse worsening dyspnea over the last few days.  She reports chest pain with deep inspiration.  Upon arrival to the emergency room she was noted to be in AFib with RVR.  Was transferred to [facility] in the early morning hours of 5/11.  Admitted to hospitalist services.  She was started empirically on Rocephin and azithromycin.  Noted to have an elevated troponin, thought to be type 2 secondary to demand ischemia in the setting of sepsis and AFib with RVR.  Cardiology has been consulted.  The time she arrived at our facility are heart rate is much better controlled.  Currently in the 70s.  Asymptomatic.  Denies any chest pain or palpitations.  Labs without leukocytosis.  Her D-dimer was mildly elevated she had a troponin of 0.169.  BNP was elevated at 2600.  Chest x-ray shows bilateral infiltrates.    PAST MEDICAL HISTORY:     Past Medical History:   Diagnosis Date    COPD (chronic obstructive pulmonary  disease)     Femur fracture, right     Paroxysmal atrial fibrillation        PAST SURGICAL HISTORY:     Past Surgical History:   Procedure Laterality Date    APPENDECTOMY      CHOLECYSTECTOMY      CORONARY ANGIOPLASTY WITH STENT PLACEMENT      HIATAL HERNIA REPAIR      HYSTERECTOMY      MASTECTOMY Bilateral        ALLERGIES:   Patient has no known allergies.    FAMILY HISTORY:   Reviewed and negative    SOCIAL HISTORY:     Social History     Tobacco Use    Smoking status: Every Day     Types: Cigarettes    Smokeless tobacco: Never   Substance Use Topics    Alcohol use: Never        HOME MEDICATIONS:     Prior to Admission medications    Medication Sig Start Date End Date Taking? Authorizing Provider   aspirin (ECOTRIN) 81 MG EC tablet Take 81 mg by mouth once daily.    Provider, Historical   clopidogreL (PLAVIX) 75 mg tablet Take 75 mg by mouth once daily.    Provider, Historical   flecainide (TAMBOCOR) 50 MG Tab Take 50 mg by mouth every 12 (twelve) hours.    Provider, Historical   furosemide (LASIX) 20 MG tablet Take 20 mg by mouth 2 (two) times daily.    Provider, Historical   iron,carb/vit C/vit B12/folic (IRON 100 PLUS ORAL) Take 1 tablet by mouth once daily.    Provider, Historical   isosorbide mononitrate (IMDUR) 30 MG 24 hr tablet Take 30 mg by mouth once daily.    Provider, Historical   pantoprazole (PROTONIX) 40 MG tablet Take 40 mg by mouth once daily.    Provider, Historical   potassium chloride SA (K-DUR,KLOR-CON) 20 MEQ tablet Take 20 mEq by mouth once daily.    Provider, Historical   theophylline (CAROLINE-24) 100 MG 24 hr capsule Take 100 mg by mouth once daily.    Provider, Historical       REVIEW OF SYSTEMS:   Except as documented, all other systems reviewed and negative     PHYSICAL EXAM:     VITAL SIGNS: 24 HRS MIN & MAX LAST   Temp  Min: 97.6 °F (36.4 °C)  Max: 99.2 °F (37.3 °C) 97.6 °F (36.4 °C)   BP  Min: 96/62  Max: 146/66 131/65   Pulse  Min: 51  Max: 169  71   Resp  Min: 11  Max: 47 17   SpO2   Min: 89 %  Max: 100 % 98 %       General appearance: Well-developed, well-nourished female in no apparent distress.  HENT: Atraumatic head. Moist mucous membranes of oral cavity.  Eyes: Normal extraocular movements.   Neck: Supple.   Lungs:  Coarse breath sounds bilaterally with expiratory wheezing  Heart: Regular rate and rhythm. S1 and S2 present with no murmurs/gallop/rub. No pedal edema. No JVD present.   Abdomen: Soft, non-distended, non-tender. No rebound tenderness/guarding. Bowel sounds are normal.   Extremities: No cyanosis, clubbing, or edema.  Skin: No Rash.   Neuro: Motor and sensory exams grossly intact. Good tone. Muscle strength 5/5 in all 4 extremities  Psych/mental status: Appropriate mood and affect. Responds appropriately to questions.     LABS AND IMAGING:     Recent Labs   Lab 05/10/25  0859   WBC 8.86   RBC 3.78*   HGB 11.1*   HCT 33.9*   MCV 89.7   MCH 29.4   MCHC 32.7   RDW 13.5   *   MPV 9.7       Recent Labs   Lab 05/10/25  0851 05/10/25  0859   NA  --  134*   K  --  4.5   CL  --  104   CO2  --  25   BUN  --  19   CREATININE  --  0.46*   GLU  --  124*   CALCIUM  --  9.6   PH 7.371  --    ALBUMIN  --  3.7   PROT  --  6.5   ALKPHOS  --  101   ALT  --  15   AST  --  22   BILITOT  --  0.6       Microbiology Results (last 7 days)       ** No results found for the last 168 hours. **             CTA Chest Non-Coronary (PE Studies)  Narrative: EXAMINATION:  CTA CHEST NON CORONARY (PE STUDIES)    CPT: 30049    CLINICAL HISTORY:  Pulmonary embolism (PE) suspected, positive D-dimer;.    TECHNIQUE:  Axial CTA slices through the chest obtained after the administration of intravenous contrast. Coronal and sagittal MIPS were created. Total DLP for the study is approximately 127.1 mGy-cm. Automated exposure control was utilized.    COMPARISON:  None    FINDINGS:  No evidence of central or segmental pulmonary arterial filling defects to suggest central or segmental pulmonary embolus is visualized.   The heart appears to be enlarged with right atrial predominance.  Atherosclerotic vascular calcifications are seen including scattered coronary and aortic calcifications.  Trace pericardial effusion is seen.  There appears to be borderline subcarinal lymph node measuring 9-10 mm in short axis dimension.  There are additional small but somewhat prolific mediastinal lymph nodes.  These lymph nodes are indeterminate, possibly reactive.  There appears to be a 12 mm in short axis dimension right hilar lymph node.  Subcentimeter in short axis dimension left hilar and bilateral axillary lymph nodes are noted.  Small right larger than left bilateral dependent pleural effusions are seen.  There appear to be airspace opacities in the adjacent bilateral lower lobes with volume loss which may reflect compressive atelectasis, but underlying infiltrate cannot be excluded.  There are multifocal reticulonodular opacities in the bilateral lower lobes, right greater than left upper lobes, and right middle lobe which may reflect sequelae of infectious or inflammatory processes.  There is a more confluent area of streaky airspace disease with air bronchograms in the anterior right middle lobe and more confluent airspace disease in the lateral right lung base which may reflect infectious or inflammatory process.  Follow-up to ensure resolution is recommended.  There are streaky opacities in the bilateral lung bases which may reflect atelectasis.  No visible pneumothorax is appreciated.  The central airways appear grossly patent.  Calcified nodule in the right middle lobe is noted.  No evidence of pneumothorax is appreciated.  There appears to be evidence of moderate-sized Rissa off a gel hiatal hernia.  The included upper abdomen demonstrates no definite acute abnormalities.  Upper abdominal atherosclerosis is seen.  Small splenic calcification is seen which may reflect prior granulomatous disease.  The visualized osseous structures  appear demineralized.  Degenerative changes affect the visualized spine.  Moderate superior endplate L1 compression fracture deformity of indeterminate chronicity is seen.  No obvious acute appearing fracture lucencies or paravertebral hematoma are appreciated at this time.  Impression: 1. No evidence of central or segmental pulmonary arterial filling defects to suggest central or segmental pulmonary embolus is visualized.  2. Small pericardial effusion is seen.  Cardiomegaly is noted.  3. There appear to be airspace opacities in the bilateral lower lobes adjacent to the pleural effusions which may be related to compressive atelectasis, but underlying infiltrate cannot be excluded.  There are multifocal reticulonodular opacities in the bilateral lungs which may reflect infectious/inflammatory process.  Follow-up with CT in 3 months is recommended.  4. There are more confluent streaky airspace opacities in the right middle lobe and lateral right lung base which may be related to infectious/inflammatory process.  Attention on follow-up to ensure resolution is recommended.  5. Borderline subcarinal lymph node is seen along with additional small but somewhat prolific mediastinal lymph nodes.  Mildly enlarged right hilar lymph node is seen.  These lymph nodes are indeterminate, possibly reactive.  Recommend attention on follow-up.  6. Additional findings and details as above.    Electronically signed by: Hector Xie MD  Date:    05/10/2025  Time:    11:47  X-Ray Chest 1 View  Narrative: EXAMINATION:  XR CHEST 1 VIEW    CLINICAL HISTORY:  shortness of breath;    TECHNIQUE:  Single frontal view of the chest was performed.    COMPARISON:  December 13, 2024    FINDINGS:  Heart size remains enlarged.  There has been interval development of bilateral infiltrates and small effusions, right greater than left.  No pneumothorax is seen.  Emphysematous changes are again noted.  Impression: Stable cardiomegaly is  noted.    Bilateral basilar infiltrates and small effusions are now present, right greater than left.    Electronically signed by: Chapo Golden  Date:    05/10/2025  Time:    08:47      _____________________________________  INPATIENT LIST OF MEDICATIONS   Current Medications[1]      Scheduled Meds:   aspirin  81 mg Oral Daily    azithromycin  500 mg Intravenous Q24H    cefTRIAXone (Rocephin) IV (PEDS and ADULTS)  1 g Intravenous Q24H    clopidogreL  75 mg Oral Daily    enoxparin  1 mg/kg Subcutaneous Q12H (prophylaxis, 0900/2100)    furosemide  20 mg Oral BID    isosorbide mononitrate  30 mg Oral Daily    [START ON 5/12/2025] mupirocin   Nasal BID    nicotine  1 patch Transdermal Daily    pantoprazole  40 mg Oral Daily    potassium chloride SA  20 mEq Oral Daily    theophylline  100 mg Oral Daily     Continuous Infusions:  PRN Meds:.  Current Facility-Administered Medications:     acetaminophen, 1,000 mg, Oral, Q6H PRN    aluminum-magnesium hydroxide-simethicone, 30 mL, Oral, QID PRN    bisacodyL, 10 mg, Rectal, Daily PRN    dextrose 50%, 12.5 g, Intravenous, PRN    dextrose 50%, 25 g, Intravenous, PRN    glucagon (human recombinant), 1 mg, Intramuscular, PRN    glucose, 16 g, Oral, PRN    glucose, 24 g, Oral, PRN    melatonin, 6 mg, Oral, Nightly PRN    naloxone, 0.02 mg, Intravenous, PRN    ondansetron, 4 mg, Intravenous, Q4H PRN    prochlorperazine, 5 mg, Intravenous, Q6H PRN    senna-docusate, 1 tablet, Oral, BID PRN    sodium chloride 0.9%, 10 mL, Intravenous, PRN      VTE Prophylaxis: will be placed on appropriate DVT prophylaxis and will be advised to be as mobile as possible and sit in a chair as tolerated  _____________________________________    ASSESSMENT & PLAN:   Bilateral community-acquired pneumonia   COPD exacerbation   Acute hypoxemia without respiratory distress   AFib with RVR, resolved   Essential hypertension   Coronary artery disease   Tobacco abuse    Will deescalate her IV antibiotics.   She is not appear to be septic.  No leukocytosis.  Minimal hypoxemia but suspect some of this is chronic in the setting of longstanding COPD.  Will place her on oral Levaquin alone.    She does have some wheezing on exam.  Again likely secondary to COPD.  Will start with some oral steroids.    Heart rate is better controlled.  No longer in RVR.  Cardiology was consulted but will plan to continue with her current regimen.    If she does okay with the next 24 hours can likely be discharged home tomorrow      Wilber Blandon MD  6:24 AM 05/11/2025    Screening for Social Drivers for health:  Patient screened for food insecurity, housing instability, transportation needs, utility difficulties, and interpersonal safety (select all that apply as identified as concern)  []Housing or Food  []Transportation Needs  []Utility Difficulties  []Interpersonal safety  [x]None             [1]   Current Facility-Administered Medications:     acetaminophen tablet 1,000 mg, 1,000 mg, Oral, Q6H PRN, Sarai Awan, KENANP    aluminum-magnesium hydroxide-simethicone 200-200-20 mg/5 mL suspension 30 mL, 30 mL, Oral, QID PRN, Sarai Awan FNP    aspirin EC tablet 81 mg, 81 mg, Oral, Daily, Wilber Blandon MD    azithromycin (ZITHROMAX) 500 mg in D5W 250 mL  IVPB (admixture device), 500 mg, Intravenous, Q24H, Sarai Awan FNP    bisacodyL suppository 10 mg, 10 mg, Rectal, Daily PRN, Sarai Awan FNP    cefTRIAXone injection 1 g, 1 g, Intravenous, Q24H, Sarai Awan FNP    clopidogreL tablet 75 mg, 75 mg, Oral, Daily, Wilber Blandon MD    dextrose 50% injection 12.5 g, 12.5 g, Intravenous, PRN, Sarai Awan FNP    dextrose 50% injection 25 g, 25 g, Intravenous, PRN, Sarai Awan FNP    enoxaparin injection 1 mg/kg, 1 mg/kg, Subcutaneous, Q12H (prophylaxis, 0900/2100), Sarai Awan FNP    furosemide tablet 20 mg, 20 mg, Oral, BID, Wilber Blandon MD    glucagon (human recombinant) injection 1 mg, 1 mg,  Intramuscular, PRN, Sarai Awan FNP    glucose chewable tablet 16 g, 16 g, Oral, PRN, Sarai Awan FNP    glucose chewable tablet 24 g, 24 g, Oral, PRN, Sarai Awan FNP    isosorbide mononitrate 24 hr tablet 30 mg, 30 mg, Oral, Daily, Wilber Blandon MD    melatonin tablet 6 mg, 6 mg, Oral, Nightly PRN, Sarai Awan FNP    [START ON 5/12/2025] mupirocin 2 % ointment, , Nasal, BID, Chong Rodrigues MD    naloxone 0.4 mg/mL injection 0.02 mg, 0.02 mg, Intravenous, PREmperatriz CHAVEZ Brandy L, FNP    nicotine 14 mg/24 hr 1 patch, 1 patch, Transdermal, Daily, Wilber Blandon MD    ondansetron injection 4 mg, 4 mg, Intravenous, Q4H PRN, Sarai Awan FNP    pantoprazole EC tablet 40 mg, 40 mg, Oral, Daily, Wilber Blandon MD    potassium chloride SA CR tablet 20 mEq, 20 mEq, Oral, Daily, Wilber Blandon MD    prochlorperazine injection Soln 5 mg, 5 mg, Intravenous, Q6H PRNEmperatriz Brandy L, FNP    senna-docusate 8.6-50 mg per tablet 1 tablet, 1 tablet, Oral, BID PRN, Sarai Awan FNP    sodium chloride 0.9% flush 10 mL, 10 mL, Intravenous, PRNEmperatriz Brandy L, FNP    theophylline 24 hr capsule 100 mg, 100 mg, Oral, Daily, Wilber Blandon MD

## 2025-05-11 NOTE — CONSULTS
Inpatient consult to Cardiology  Consult performed by: Malu Martin FNP  Consult ordered by: Sarai Awan FNP        [facility]    Cardiology  Consult Note    Patient Name: Stephanie Gutierrez  MRN: [id number]  Admission Date: 5/11/2025  Hospital Length of Stay: 0 days  Code Status: Full Code   Attending Provider: Wilber Blandon MD   Consulting Provider: JOSE LUIS Dhillon  Primary Care Physician: Sj Paige MD  Principal Problem:<principal problem not specified>    Patient information was obtained from patient, past medical records, and ER records.     Subjective:     Chief Complaint/Reason for Consult:     HPI:   Ms. Gutierrez is a 89y/o female, unknown to [facility], who presented to ED with c/o productive cough, worsening SINGH, and chest pain with deep inspiration.Labs significant for Troponin 0.072-S0.130-0.169-0.053. pBNP 2670., . Flu/COVID negative. CXR revealing bibasilar infiltrates with small effusions. CTA chest negative PE but again reveals bibasilar opacities. She was found to be in Afib RVR; therefore she was seen through [facility] telecardiology with recommendations for resuming flecainide and transfer to facility with cardiology services if substantial rise in troponin. She has been transferred to [facility] with admit to hospital medicine. [facility] consulted for NSTEMI/Afib RVR. She is currently SR      PMH:  CAD, PAF, COPD  PSH:  LHC/PCI, appendectomy, cholecystectomy, hiatal hernia repair, mastectomy, hysterectomy  Family History:  Unknown  Social History:  Current smoker    Previous Cardiac Diagnostics:   Non file    Past Medical History:   Diagnosis Date    COPD (chronic obstructive pulmonary disease)     Femur fracture, right     Paroxysmal atrial fibrillation      Past Surgical History:   Procedure Laterality Date    APPENDECTOMY      CHOLECYSTECTOMY      CORONARY ANGIOPLASTY WITH STENT PLACEMENT      HIATAL HERNIA REPAIR      HYSTERECTOMY      MASTECTOMY  Bilateral      Review of patient's allergies indicates:  No Known Allergies  Current Facility-Administered Medications on File Prior to Encounter   Medication    [COMPLETED] 0.9% NaCl infusion    [COMPLETED] albuterol-ipratropium 2.5 mg-0.5 mg/3 mL nebulizer solution 3 mL    [COMPLETED] azithromycin (ZITHROMAX) 500 mg in 0.9% NaCl 250 mL IVPB (admixture device)    [COMPLETED] cefTRIAXone injection 1 g    [COMPLETED] enoxaparin injection 40 mg    [COMPLETED] iohexoL (OMNIPAQUE 350) injection 93 mL    [DISCONTINUED] cefTRIAXone injection 1 g    [DISCONTINUED] diltiaZEM 100 mg in dextrose 5% 100 mL IVPB (ready to mix) (titrating)     Current Outpatient Medications on File Prior to Encounter   Medication Sig    aspirin (ECOTRIN) 81 MG EC tablet Take 81 mg by mouth once daily.    clopidogreL (PLAVIX) 75 mg tablet Take 75 mg by mouth once daily.    flecainide (TAMBOCOR) 50 MG Tab Take 50 mg by mouth every 12 (twelve) hours.    furosemide (LASIX) 20 MG tablet Take 20 mg by mouth 2 (two) times daily.    iron,carb/vit C/vit B12/folic (IRON 100 PLUS ORAL) Take 1 tablet by mouth once daily.    isosorbide mononitrate (IMDUR) 30 MG 24 hr tablet Take 30 mg by mouth once daily.    pantoprazole (PROTONIX) 40 MG tablet Take 40 mg by mouth once daily.    potassium chloride SA (K-DUR,KLOR-CON) 20 MEQ tablet Take 20 mEq by mouth once daily.    theophylline (CAROLINE-24) 100 MG 24 hr capsule Take 100 mg by mouth once daily.     Family History    None       Tobacco Use    Smoking status: Every Day     Types: Cigarettes    Smokeless tobacco: Never   Substance and Sexual Activity    Alcohol use: Never    Drug use: Never    Sexual activity: Not on file       Review of Systems   Constitutional: Negative.    Respiratory:  Positive for cough and shortness of breath.    Cardiovascular:  Negative for chest pain.   Gastrointestinal: Negative.    Musculoskeletal: Negative.    Neurological: Negative.      Objective:     Vital Signs (Most  "Recent):  Temp: 98 °F (36.7 °C) (05/11/25 0745)  Pulse: 74 (05/11/25 0745)  Resp: 19 (05/11/25 0745)  BP: 131/66 (05/11/25 0745)  SpO2: 99 % (05/11/25 0745) Vital Signs (24h Range):  Temp:  [97.6 °F (36.4 °C)-98.5 °F (36.9 °C)] 98 °F (36.7 °C)  Pulse:  [62-88] 74  Resp:  [11-39] 19  SpO2:  [89 %-100 %] 99 %  BP: (100-146)/(39-71) 131/66      There is no height or weight on file to calculate BMI.  SpO2: 99 %     No intake or output data in the 24 hours ending 05/11/25 1024  Lines/Drains/Airways       None                 Significant Labs:   Chemistries:   Recent Labs   Lab 05/10/25  0859 05/10/25  1058 05/10/25  1240 05/11/25  0610   *  --   --  140   K 4.5  --   --  4.7     --   --  106   CO2 25  --   --  27   BUN 19  --   --  22.5*   CREATININE 0.46*  --   --  0.60   CALCIUM 9.6  --   --  9.2   PROT 6.5  --   --  5.7*   BILITOT 0.6  --   --  0.2   ALKPHOS 101  --   --  150   ALT 15  --   --  30   AST 22  --   --  22   MG  --   --   --  2.40   TROPONINI 0.072* 0.130* 0.169* 0.053*        CBC/Anemia Labs: Coags:    Recent Labs   Lab 05/10/25  0859 05/11/25  0611   WBC 8.86 6.80   HGB 11.1* 10.9*   HCT 33.9* 35.6*   * 377   MCV 89.7 93.9   RDW 13.5 13.9    No results for input(s): "PT", "INR", "APTT" in the last 168 hours.     Significant Imaging:    EKG:         Physical Exam  Constitutional:       Comments: frail   Cardiovascular:      Rate and Rhythm: Normal rate. Rhythm irregular.      Pulses: Normal pulses.      Heart sounds: Normal heart sounds.   Pulmonary:      Breath sounds: Rhonchi present.   Abdominal:      Palpations: Abdomen is soft.   Musculoskeletal:         General: Normal range of motion.      Right lower leg: No edema.      Left lower leg: No edema.   Skin:     General: Skin is warm.      Capillary Refill: Capillary refill takes less than 2 seconds.   Neurological:      General: No focal deficit present.      Mental Status: She is alert.   Psychiatric:         Mood and Affect: Mood " normal.       Home Medications:   Medications Ordered Prior to Encounter[1]  Current Schedule Inpatient Medications:   aspirin  81 mg Oral Daily    clopidogreL  75 mg Oral Daily    enoxparin  1 mg/kg Subcutaneous Q12H (prophylaxis, 0900/2100)    furosemide  20 mg Oral BID    isosorbide mononitrate  30 mg Oral Daily    levoFLOXacin  750 mg Oral Daily    [START ON 5/12/2025] mupirocin   Nasal BID    nicotine  1 patch Transdermal Daily    pantoprazole  40 mg Oral Daily    potassium chloride SA  20 mEq Oral Daily    predniSONE  20 mg Oral BID    theophylline  100 mg Oral Daily     Continuous Infusions:    Assessment:   NSTEMI, likely demand ischemia related to Afib RVR/Pnuemonia  PAFib, RVR  Elevated BNP (724)  B Pneumonia  CAD  COPD  RBBB  Anemia  Tobacco dependence    Plan:   ABX per primary  Continue Plavix and aspirin  Start Cardizem 120 mg daily  Continue weight based Lovenox for CVA prophylaxis with conversion to Doac prior to DC home      Thank you for your consult.     Malu Martin, FNP  Cardiology  [facility]     I personally reviewed the NP history and physical above.  See below MDM component performed by me (Yovany Yates MD):    NSTEMI    Prob type 2 in light of PNA and RVR  PAF    Some recent RVR, she was on outpt Flecanide (Theodur likely a trigger as well)    Back in sinus    Sounds like she refused NOAC in the past (is on DAPT due to prior PCI years ago)    She sees Dr Mahoney in [place]  CHF, per BNP    Unspecified type  Smoker  COPD  Underweight, very frail  PNA    Recs:  D/c Theophylline  Rx of COPD per primary  Rate control, adding Dilt  Cont outpt Flecanide for now, might change later pending echo  Cont DAPT  Cont Lovenox for now  F/u Echo  Will follow         [1]   Current Facility-Administered Medications on File Prior to Encounter   Medication Dose Route Frequency Provider Last Rate Last Admin    [COMPLETED] 0.9% NaCl infusion  1,000 mL Intravenous ED 1 Time  Estela Louis MD   Stopped at 05/10/25 1158    [COMPLETED] albuterol-ipratropium 2.5 mg-0.5 mg/3 mL nebulizer solution 3 mL  3 mL Nebulization ED 1 Time Estela Louis MD   3 mL at 05/10/25 1028    [COMPLETED] azithromycin (ZITHROMAX) 500 mg in 0.9% NaCl 250 mL IVPB (admixture device)  500 mg Intravenous ED 1 Time Estela Louis MD   Stopped at 05/10/25 1235    [COMPLETED] cefTRIAXone injection 1 g  1 g Intravenous ED 1 Time Estela Louis MD   1 g at 05/10/25 1124    [COMPLETED] enoxaparin injection 40 mg  40 mg Subcutaneous ED 1 Time Estela Lousi MD   40 mg at 05/10/25 1123    [COMPLETED] iohexoL (OMNIPAQUE 350) injection 93 mL  93 mL Intravenous ONCE PRN Estela Louis MD   67 mL at 05/10/25 1120    [DISCONTINUED] cefTRIAXone injection 1 g  1 g Intramuscular ED 1 Time Estela Louis MD        [DISCONTINUED] diltiaZEM 100 mg in dextrose 5% 100 mL IVPB (ready to mix) (titrating)  0-15 mg/hr Intravenous Continuous Estela Louis MD   Held at 05/10/25 1030     Current Outpatient Medications on File Prior to Encounter   Medication Sig Dispense Refill    aspirin (ECOTRIN) 81 MG EC tablet Take 81 mg by mouth once daily.      clopidogreL (PLAVIX) 75 mg tablet Take 75 mg by mouth once daily.      flecainide (TAMBOCOR) 50 MG Tab Take 50 mg by mouth every 12 (twelve) hours.      furosemide (LASIX) 20 MG tablet Take 20 mg by mouth 2 (two) times daily.      iron,carb/vit C/vit B12/folic (IRON 100 PLUS ORAL) Take 1 tablet by mouth once daily.      isosorbide mononitrate (IMDUR) 30 MG 24 hr tablet Take 30 mg by mouth once daily.      pantoprazole (PROTONIX) 40 MG tablet Take 40 mg by mouth once daily.      potassium chloride SA (K-DUR,KLOR-CON) 20 MEQ tablet Take 20 mEq by mouth once daily.      theophylline (CAROLINE-24) 100 MG 24 hr capsule Take 100 mg by mouth once daily.

## 2025-05-11 NOTE — ED NOTES
Report received and care of pt assumed. Pt resting comfortably in bed with no current complaint. VSS. Awaiting bed assignment for transfer.

## 2025-05-11 NOTE — ED NOTES
Pt expresses frustration that the transfer process is taking too long and states that if the ambulance doesn't pick her up by 2100 she is calling her family to come and pick her up to go home.

## 2025-05-12 LAB
ANION GAP SERPL CALC-SCNC: 7 MEQ/L
BASOPHILS # BLD AUTO: 0.01 X10(3)/MCL
BASOPHILS NFR BLD AUTO: 0.1 %
BUN SERPL-MCNC: 25.8 MG/DL (ref 9.8–20.1)
CALCIUM SERPL-MCNC: 9.5 MG/DL (ref 8.4–10.2)
CHLORIDE SERPL-SCNC: 102 MMOL/L (ref 98–111)
CO2 SERPL-SCNC: 31 MMOL/L (ref 23–31)
CREAT SERPL-MCNC: 0.65 MG/DL (ref 0.55–1.02)
CREAT/UREA NIT SERPL: 40
EOSINOPHIL # BLD AUTO: 0 X10(3)/MCL (ref 0–0.9)
EOSINOPHIL NFR BLD AUTO: 0 %
ERYTHROCYTE [DISTWIDTH] IN BLOOD BY AUTOMATED COUNT: 13.6 % (ref 11.5–17)
GFR SERPLBLD CREATININE-BSD FMLA CKD-EPI: >60 ML/MIN/1.73/M2
GLUCOSE SERPL-MCNC: 114 MG/DL (ref 75–121)
HCT VFR BLD AUTO: 37.7 % (ref 37–47)
HGB BLD-MCNC: 11.6 G/DL (ref 12–16)
IMM GRANULOCYTES # BLD AUTO: 0.04 X10(3)/MCL (ref 0–0.04)
IMM GRANULOCYTES NFR BLD AUTO: 0.5 %
LYMPHOCYTES # BLD AUTO: 0.31 X10(3)/MCL (ref 0.6–4.6)
LYMPHOCYTES NFR BLD AUTO: 3.9 %
MCH RBC QN AUTO: 28.5 PG (ref 27–31)
MCHC RBC AUTO-ENTMCNC: 30.8 G/DL (ref 33–36)
MCV RBC AUTO: 92.6 FL (ref 80–94)
MONOCYTES # BLD AUTO: 0.42 X10(3)/MCL (ref 0.1–1.3)
MONOCYTES NFR BLD AUTO: 5.3 %
NEUTROPHILS # BLD AUTO: 7.18 X10(3)/MCL (ref 2.1–9.2)
NEUTROPHILS NFR BLD AUTO: 90.2 %
NRBC BLD AUTO-RTO: 0 %
OHS QRS DURATION: 114 MS
OHS QRS DURATION: 130 MS
OHS QTC CALCULATION: 416 MS
OHS QTC CALCULATION: 530 MS
PLATELET # BLD AUTO: 404 X10(3)/MCL (ref 130–400)
PMV BLD AUTO: 9.3 FL (ref 7.4–10.4)
POTASSIUM SERPL-SCNC: 5.1 MMOL/L (ref 3.5–5.1)
RBC # BLD AUTO: 4.07 X10(6)/MCL (ref 4.2–5.4)
SODIUM SERPL-SCNC: 140 MMOL/L (ref 136–145)
WBC # BLD AUTO: 7.96 X10(3)/MCL (ref 4.5–11.5)

## 2025-05-12 PROCEDURE — 21400001 HC TELEMETRY ROOM

## 2025-05-12 PROCEDURE — 63600175 PHARM REV CODE 636 W HCPCS: Performed by: NURSE PRACTITIONER

## 2025-05-12 PROCEDURE — 25000003 PHARM REV CODE 250: Performed by: NURSE PRACTITIONER

## 2025-05-12 PROCEDURE — 63600175 PHARM REV CODE 636 W HCPCS: Performed by: HOSPITALIST

## 2025-05-12 PROCEDURE — 25000003 PHARM REV CODE 250: Performed by: HOSPITALIST

## 2025-05-12 PROCEDURE — 93010 ELECTROCARDIOGRAM REPORT: CPT | Mod: ,,, | Performed by: INTERNAL MEDICINE

## 2025-05-12 PROCEDURE — 85025 COMPLETE CBC W/AUTO DIFF WBC: CPT | Performed by: HOSPITALIST

## 2025-05-12 PROCEDURE — 25000003 PHARM REV CODE 250: Performed by: INTERNAL MEDICINE

## 2025-05-12 PROCEDURE — 93005 ELECTROCARDIOGRAM TRACING: CPT

## 2025-05-12 PROCEDURE — S4991 NICOTINE PATCH NONLEGEND: HCPCS | Performed by: HOSPITALIST

## 2025-05-12 PROCEDURE — 99900031 HC PATIENT EDUCATION (STAT)

## 2025-05-12 PROCEDURE — 36415 COLL VENOUS BLD VENIPUNCTURE: CPT | Performed by: HOSPITALIST

## 2025-05-12 PROCEDURE — 27000221 HC OXYGEN, UP TO 24 HOURS

## 2025-05-12 PROCEDURE — 80048 BASIC METABOLIC PNL TOTAL CA: CPT | Performed by: HOSPITALIST

## 2025-05-12 RX ORDER — DILTIAZEM HYDROCHLORIDE 120 MG/1
120 CAPSULE, COATED, EXTENDED RELEASE ORAL DAILY
Status: DISCONTINUED | OUTPATIENT
Start: 2025-05-12 | End: 2025-05-13 | Stop reason: HOSPADM

## 2025-05-12 RX ADMIN — FUROSEMIDE 20 MG: 20 TABLET ORAL at 09:05

## 2025-05-12 RX ADMIN — Medication 6 MG: at 09:05

## 2025-05-12 RX ADMIN — PREDNISONE 20 MG: 20 TABLET ORAL at 09:05

## 2025-05-12 RX ADMIN — PANTOPRAZOLE SODIUM 40 MG: 40 TABLET, DELAYED RELEASE ORAL at 09:05

## 2025-05-12 RX ADMIN — NICOTINE 1 PATCH: 14 PATCH TRANSDERMAL at 09:05

## 2025-05-12 RX ADMIN — ENOXAPARIN SODIUM 40 MG: 40 INJECTION SUBCUTANEOUS at 09:05

## 2025-05-12 RX ADMIN — LEVOFLOXACIN 750 MG: 500 TABLET, FILM COATED ORAL at 09:05

## 2025-05-12 RX ADMIN — ISOSORBIDE MONONITRATE 30 MG: 30 TABLET, EXTENDED RELEASE ORAL at 09:05

## 2025-05-12 RX ADMIN — CLOPIDOGREL 75 MG: 75 TABLET ORAL at 09:05

## 2025-05-12 RX ADMIN — FLECAINIDE ACETATE 50 MG: 50 TABLET ORAL at 09:05

## 2025-05-12 RX ADMIN — MUPIROCIN: 20 OINTMENT TOPICAL at 09:05

## 2025-05-12 RX ADMIN — POTASSIUM CHLORIDE 20 MEQ: 1500 TABLET, EXTENDED RELEASE ORAL at 09:05

## 2025-05-12 RX ADMIN — ASPIRIN 81 MG: 81 TABLET, COATED ORAL at 09:05

## 2025-05-12 RX ADMIN — MUPIROCIN: 20 OINTMENT TOPICAL at 12:05

## 2025-05-12 RX ADMIN — DILTIAZEM HYDROCHLORIDE 120 MG: 120 CAPSULE, COATED, EXTENDED RELEASE ORAL at 12:05

## 2025-05-12 NOTE — PLAN OF CARE
05/12/25 0910   Discharge Assessment   Assessment Type Discharge Planning Assessment   Confirmed/corrected address, phone number and insurance Yes   Confirmed Demographics Correct on Facesheet   Source of Information patient   Reason For Admission SOB   People in Home alone   Do you expect to return to your current living situation? Yes   Do you have help at home or someone to help you manage your care at home? Yes   Who are your caregiver(s) and their phone number(s)? clair and aarti   Prior to hospitilization cognitive status: Alert/Oriented   Current cognitive status: Alert/Oriented   Walking or Climbing Stairs Difficulty yes   Walking or Climbing Stairs ambulation difficulty, requires equipment   Mobility Management walker wc   Dressing/Bathing Difficulty yes   Dressing/Bathing bathing difficulty, requires equipment;bathing difficulty, assistance 1 person;dressing difficulty, assistance 1 person   Dressing/Bathing Management bath bench/aarti-clair   Equipment Currently Used at Home nebulizer;oxygen;walker, rolling;wheelchair;bath bench   Readmission within 30 days? No   Patient currently being followed by outpatient case management? No   Do you currently have service(s) that help you manage your care at home? No   Do you take prescription medications? Yes   Do you have prescription coverage? Yes   Coverage mcr   Do you have any problems affording any of your prescribed medications? No   Is the patient taking medications as prescribed? yes   Who is going to help you get home at discharge? family   How do you get to doctors appointments? family or friend will provide   Are you on dialysis? No   Do you take coumadin? No   Discharge Plan A Home;Home Health   Discharge Plan B Home   DME Needed Upon Discharge  none   Discharge Plan discussed with: Patient   Transition of Care Barriers None   Physical Activity   On average, how many days per week do you engage in moderate to strenuous exercise (like a brisk walk)? 0  days   On average, how many minutes do you engage in exercise at this level? 0 min   Financial Resource Strain   How hard is it for you to pay for the very basics like food, housing, medical care, and heating? Not very   Housing Stability   In the last 12 months, was there a time when you were not able to pay the mortgage or rent on time? N   At any time in the past 12 months, were you homeless or living in a shelter (including now)? N   Transportation Needs   In the past 12 months, has lack of transportation kept you from medical appointments or from getting medications? no   In the past 12 months, has lack of transportation kept you from meetings, work, or from getting things needed for daily living? No   Food Insecurity   Within the past 12 months, you worried that your food would run out before you got the money to buy more. Never true   Within the past 12 months, the food you bought just didn't last and you didn't have money to get more. Never true   Stress   Do you feel stress - tense, restless, nervous, or anxious, or unable to sleep at night because your mind is troubled all the time - these days? Not at all   Social Isolation   How often do you feel lonely or isolated from those around you?  Never   Alcohol Use   Q1: How often do you have a drink containing alcohol? Never   [id number]   In the past 12 months has the electric, gas, oil, or water company threatened to shut off services in your home? No   Health Literacy   How often do you need to have someone help you when you read instructions, pamphlets, or other written material from your doctor or pharmacy? Sometimes

## 2025-05-12 NOTE — PROGRESS NOTES
[facility]  Hospital Medicine Progress Note        Chief Complaint: Inpatient Follow-up     HPI:     89-year-old female with past medical history of CAD, atrial fibrillation, COPD, tobacco abuse presents to the emergency room at an outside facility secondary to productive cough and sputum.  Also endorse worsening dyspnea over the last few days.  She reports chest pain with deep inspiration.  Upon arrival to the emergency room she was noted to be in AFib with RVR.  Was transferred to [facility] in the early morning hours of 5/11.  Admitted to hospitalist services.  She was started empirically on Rocephin and azithromycin.  Noted to have an elevated troponin, thought to be type 2 secondary to demand ischemia in the setting of sepsis and AFib with RVR.  Cardiology has been consulted.  The time she arrived at our facility are heart rate is much better controlled.  Currently in the 70s.  Asymptomatic.  Denies any chest pain or palpitations.  Labs without leukocytosis.  Her D-dimer was mildly elevated she had a troponin of 0.169.  BNP was elevated at 2600.  Chest x-ray shows bilateral infiltrates.     Interval Hx:  From a respiratory standpoint significantly improved.  Saturating in the upper 90s on 1 L nasal cannula.  No wheezing this morning.  Afebrile.  Denies any chest pain or palpitations.  Cardiology evaluated yesterday and adjusted some of her medications.    Objective/physical exam:  General: In no acute distress, afebrile  Chest: Clear to auscultation bilaterally  Heart: RRR, +S1, S2, no appreciable murmur  Abdomen: Soft, nontender, BS +  MSK: Warm, no lower extremity edema, no clubbing or cyanosis  Neurologic: Alert and oriented x4, Cranial nerve II-XII intact, Strength 5/5 in all 4 extremities    VITAL SIGNS: 24 HRS MIN & MAX LAST   Temp  Min: 97.4 °F (36.3 °C)  Max: 98.1 °F (36.7 °C) 97.6 °F (36.4 °C)   BP  Min: 123/64  Max: 155/70 (!) 155/70   Pulse  Min: 67   Max: 84  77   Resp  Min: 18  Max: 19 18   SpO2  Min: 88 %  Max: 99 % 96 %       Recent Labs   Lab 05/10/25  0859 05/11/25  0611 05/12/25  0550   WBC 8.86 6.80 7.96   RBC 3.78* 3.79* 4.07*   HGB 11.1* 10.9* 11.6*   HCT 33.9* 35.6* 37.7   MCV 89.7 93.9 92.6   MCH 29.4 28.8 28.5   MCHC 32.7 30.6* 30.8*   RDW 13.5 13.9 13.6   * 377 404*   MPV 9.7 9.6 9.3       Recent Labs   Lab 05/10/25  0851 05/10/25  0859 05/11/25  0610   NA  --  134* 140   K  --  4.5 4.7   CL  --  104 106   CO2  --  25 27   BUN  --  19 22.5*   CREATININE  --  0.46* 0.60   GLU  --  124* 120   CALCIUM  --  9.6 9.2   PH 7.371  --   --    MG  --   --  2.40   ALBUMIN  --  3.7 2.8*   PROT  --  6.5 5.7*   ALKPHOS  --  101 150   ALT  --  15 30   AST  --  22 22   BILITOT  --  0.6 0.2          Microbiology Results (last 7 days)       ** No results found for the last 168 hours. **             Radiology:  Echo    Left Ventricle: The left ventricle is normal in size. Normal wall   thickness. There is normal systolic function with a visually estimated   ejection fraction of 65 - 70%. Grade I diastolic dysfunction.    Right Ventricle: Systolic function is normal. TAPSE is 1.9 cm.    Aortic Valve: Severely calcified cusps. Severely restricted motion.   There is moderate stenosis. Aortic valve area by VTI is 1.1 cm². Aortic   valve peak velocity is 3.6 m/s. Mean gradient is 28 mmHg. The   dimensionless index is 0.39. There is trace aortic regurgitation.    Mitral Valve: Mildly thickened anterior leaflet. There is mitral   annular calcification. The mean pressure gradient across the mitral valve   is 4 mmHg at a heart rate of 69 bpm. There is mild to moderate   regurgitation.    Tricuspid Valve: There is mild to moderate regurgitation.    IVC/SVC: Intermediate venous pressure at 8 mmHg.    Pericardium: There is no pericardial effusion.        Medications:  Scheduled Meds:   aspirin  81 mg Oral Daily    clopidogreL  75 mg Oral Daily    enoxparin  1 mg/kg  Subcutaneous Q12H (prophylaxis, 0900/2100)    flecainide  50 mg Oral Q12H    furosemide  20 mg Oral BID    isosorbide mononitrate  30 mg Oral Daily    levoFLOXacin  750 mg Oral Daily    mupirocin   Nasal BID    nicotine  1 patch Transdermal Daily    pantoprazole  40 mg Oral Daily    potassium chloride SA  20 mEq Oral Daily    predniSONE  20 mg Oral BID    theophylline  100 mg Oral Daily     Continuous Infusions:  PRN Meds:.  Current Facility-Administered Medications:     acetaminophen, 1,000 mg, Oral, Q6H PRN    aluminum-magnesium hydroxide-simethicone, 30 mL, Oral, QID PRN    bisacodyL, 10 mg, Rectal, Daily PRN    dextrose 50%, 12.5 g, Intravenous, PRN    dextrose 50%, 25 g, Intravenous, PRN    glucagon (human recombinant), 1 mg, Intramuscular, PRN    glucose, 16 g, Oral, PRN    glucose, 24 g, Oral, PRN    melatonin, 6 mg, Oral, Nightly PRN    naloxone, 0.02 mg, Intravenous, PRN    ondansetron, 4 mg, Intravenous, Q4H PRN    prochlorperazine, 5 mg, Intravenous, Q6H PRN    senna-docusate, 1 tablet, Oral, BID PRN    sodium chloride 0.9%, 10 mL, Intravenous, PRN    Nutrition:  Nutrition consulted. Most recent weight and BMI monitored-     Measurements:  Wt Readings from Last 1 Encounters:   05/11/25 40.6 kg (89 lb 8.1 oz)   Body mass index is 16.91 kg/m².    Patient has been screened and assessed by RD.    Malnutrition Type:  Context:    Level:      Malnutrition Characteristic Summary:       Interventions/Recommendations (treatment strategy):           Assessment/Plan:   Bilateral community-acquired pneumonia   COPD exacerbation   Acute hypoxemia without respiratory distress   AFib with RVR, resolved   Essential hypertension   Coronary artery disease   Tobacco abuse    Levaquin 3 of 5, respiratory status stable   Discontinue theophylline as that has maybe spurring her AFib.    Continue steroid taper   Echocardiogram pending.    Continue dual antiplatelet therapy with aspirin and Plavix.    Wilber Blandon MD   05/12/2025      All diagnosis and differential diagnosis have been reviewed; assessment and plan has been documented; I have personally reviewed the labs and test results that are presently available; I have reviewed the patients medication list; I have reviewed the consulting providers response and recommendations. I have reviewed or attempted to review medical records based upon their availability    All of the patient's questions have been  addressed and answered. Patient's is agreeable to the above stated plan. I will continue to monitor closely and make adjustments to medical management as needed.  _____________________________________________________________________

## 2025-05-12 NOTE — PROGRESS NOTES
[facility]    Cardiology  Progress Note    Patient Name: Stephanie Gutierrez  MRN: [id number]  Admission Date: 5/11/2025  Hospital Length of Stay: 1 days  Code Status: Full Code   Attending Provider: Wilber Blandon MD   Consulting Provider: JOSE LUIS Walsh  Primary Care Physician: Sj Paige MD  Principal Problem:<principal problem not specified>    Patient information was obtained from patient, past medical records, and ER records.     Subjective:   Chief Complaint/Reason for Consult: AF RVR & Abnormal Troponin    HPI:   Ms. Gutierrez is a 89y/o female, unknown to [facility], who presented to ED with c/o productive cough, worsening SINGH, and chest pain with deep inspiration.Labs significant for Troponin 0.072-S0.130-0.169-0.053. pBNP 2670., . Flu/COVID negative. CXR revealing bibasilar infiltrates with small effusions. CTA chest negative PE but again reveals bibasilar opacities. She was found to be in Afib RVR; therefore she was seen through [facility] telecardiology with recommendations for resuming flecainide and transfer to facility with cardiology services if substantial rise in troponin. She has been transferred to [facility] with admit to hospital medicine. [facility] consulted for NSTEMI/Afib RVR. She is currently SR    Hospital Course:  5.12.25: NAD Noted. AF RVR. BP Stable.     PMH:  CAD, PAF, COPD  PSH:  LHC/PCI, appendectomy, cholecystectomy, hiatal hernia repair, mastectomy, hysterectomy  Family History:  Unknown  Social History:  Current smoker    Previous Cardiac Diagnostics:   Echocardiogram (5.11.25):  Left Ventricle: The left ventricle is normal in size. Normal wall thickness. There is normal systolic function with a visually estimated ejection fraction of 65 - 70%. Grade I diastolic dysfunction.  Right Ventricle: Systolic function is normal. TAPSE is 1.9 cm.  Aortic Valve: Severely calcified cusps. Severely restricted motion. There is moderate stenosis. Aortic valve area by VTI is 1.1  cm². Aortic valve peak velocity is 3.6 m/s. Mean gradient is 28 mmHg. The dimensionless index is 0.39. There is trace aortic regurgitation.  Mitral Valve: Mildly thickened anterior leaflet. There is mitral annular calcification. The mean pressure gradient across the mitral valve is 4 mmHg at a heart rate of 69 bpm. There is mild to moderate regurgitation.  Tricuspid Valve: There is mild to moderate regurgitation.  IVC/SVC: Intermediate venous pressure at 8 mmHg.  Pericardium: There is no pericardial effusion.    Review of patient's allergies indicates:  No Known Allergies  No current facility-administered medications on file prior to encounter.     Current Outpatient Medications on File Prior to Encounter   Medication Sig    aspirin (ECOTRIN) 81 MG EC tablet Take 81 mg by mouth once daily.    clopidogreL (PLAVIX) 75 mg tablet Take 75 mg by mouth once daily.    flecainide (TAMBOCOR) 50 MG Tab Take 50 mg by mouth every 12 (twelve) hours.    furosemide (LASIX) 20 MG tablet Take 20 mg by mouth 2 (two) times daily.    iron,carb/vit C/vit B12/folic (IRON 100 PLUS ORAL) Take 1 tablet by mouth once daily.    isosorbide mononitrate (IMDUR) 30 MG 24 hr tablet Take 30 mg by mouth once daily.    pantoprazole (PROTONIX) 40 MG tablet Take 40 mg by mouth once daily.    potassium chloride SA (K-DUR,KLOR-CON) 20 MEQ tablet Take 20 mEq by mouth once daily.    theophylline (CAROLINE-24) 100 MG 24 hr capsule Take 100 mg by mouth once daily.     Review of Systems   Respiratory:  Positive for shortness of breath and wheezing.    Cardiovascular:  Negative for chest pain and leg swelling.     Objective:     Vital Signs (Most Recent):  Temp: 98 °F (36.7 °C) (05/12/25 0743)  Pulse: 68 (05/12/25 0743)  Resp: 19 (05/12/25 0743)  BP: (!) 151/76 (05/12/25 0743)  SpO2: 96 % (05/12/25 0743) Vital Signs (24h Range):  Temp:  [97.4 °F (36.3 °C)-98.1 °F (36.7 °C)] 98 °F (36.7 °C)  Pulse:  [67-84] 68  Resp:  [18-19] 19  SpO2:  [88 %-98 %] 96 %  BP:  "(123-155)/(56-76) 151/76   Weight: 40.6 kg (89 lb 8.1 oz)  Body mass index is 16.91 kg/m².  SpO2: 96 %     No intake or output data in the 24 hours ending 05/12/25 0924  Lines/Drains/Airways       None                 Significant Labs:   Chemistries:   Recent Labs   Lab 05/10/25  0859 05/10/25  1058 05/10/25  1240 05/11/25  0610 05/11/25  0953 05/11/25  1523 05/12/25  0550   *  --   --  140  --   --  140   K 4.5  --   --  4.7  --   --  5.1     --   --  106  --   --  102   CO2 25  --   --  27  --   --  31   BUN 19  --   --  22.5*  --   --  25.8*   CREATININE 0.46*  --   --  0.60  --   --  0.65   CALCIUM 9.6  --   --  9.2  --   --  9.5   PROT 6.5  --   --  5.7*  --   --   --    BILITOT 0.6  --   --  0.2  --   --   --    ALKPHOS 101  --   --  150  --   --   --    ALT 15  --   --  30  --   --   --    AST 22  --   --  22  --   --   --    MG  --   --   --  2.40  --   --   --    TROPONINI 0.072* 0.130* 0.169* 0.053* 0.086* 0.063*  --         CBC/Anemia Labs: Coags:    Recent Labs   Lab 05/10/25  0859 05/11/25  0611 05/12/25  0550   WBC 8.86 6.80 7.96   HGB 11.1* 10.9* 11.6*   HCT 33.9* 35.6* 37.7   * 377 404*   MCV 89.7 93.9 92.6   RDW 13.5 13.9 13.6    No results for input(s): "PT", "INR", "APTT" in the last 168 hours.     Significant Imaging:    EKG:         Physical Exam  Vitals and nursing note reviewed.   Constitutional:       General: She is not in acute distress.     Appearance: She is ill-appearing.   Cardiovascular:      Rate and Rhythm: Tachycardia present. Rhythm irregular.   Pulmonary:      Effort: Pulmonary effort is normal.      Breath sounds: Wheezing present.   Musculoskeletal:      Right lower leg: No edema.      Left lower leg: No edema.   Skin:     General: Skin is warm and dry.   Neurological:      Mental Status: She is alert.       Home Medications:   Medications Ordered Prior to Encounter[1]  Current Schedule Inpatient Medications:   aspirin  81 mg Oral Daily    clopidogreL  75 mg " Oral Daily    enoxparin  1 mg/kg Subcutaneous Q12H (prophylaxis, 0900/2100)    flecainide  50 mg Oral Q12H    furosemide  20 mg Oral BID    isosorbide mononitrate  30 mg Oral Daily    levoFLOXacin  750 mg Oral Daily    mupirocin   Nasal BID    nicotine  1 patch Transdermal Daily    pantoprazole  40 mg Oral Daily    potassium chloride SA  20 mEq Oral Daily    predniSONE  20 mg Oral BID       Assessment:   NSTEMI Type II due to AF RVR & Pneumonia  PAF- Now AF RVR    - Sounds like she refused NOAC in the past (is on DAPT due to prior PCI years ago)    - EF 65-70%  VHD    - AS: Moderate, MR: Mild to Moderate, TR: Mild to Moderate  Elevated BNP (724)  B Pneumonia  CAD  COPD  RBBB  Anemia  Tobacco dependence    Plan:   Continue Cardizem  Mg Daily- Advance PRN for HR Control HR < 100.  Continue Flecainde 50 Mg PO BID  Continue Plavix and aspirin  Start Cardizem 120 mg daily  Consider starting DOAC for stroke risk reduction if patient consents. Would need to drop one antiplatelet agent to avoid triple therapy.  K+ Goal 4 Mag Goal 2  Outpatient Aortic Valve Surveillance as per Dr. Mahoney.    Jayne Torres, P  Cardiology  [facility]        [1]   No current facility-administered medications on file prior to encounter.     Current Outpatient Medications on File Prior to Encounter   Medication Sig Dispense Refill    aspirin (ECOTRIN) 81 MG EC tablet Take 81 mg by mouth once daily.      clopidogreL (PLAVIX) 75 mg tablet Take 75 mg by mouth once daily.      flecainide (TAMBOCOR) 50 MG Tab Take 50 mg by mouth every 12 (twelve) hours.      furosemide (LASIX) 20 MG tablet Take 20 mg by mouth 2 (two) times daily.      iron,carb/vit C/vit B12/folic (IRON 100 PLUS ORAL) Take 1 tablet by mouth once daily.      isosorbide mononitrate (IMDUR) 30 MG 24 hr tablet Take 30 mg by mouth once daily.      pantoprazole (PROTONIX) 40 MG tablet Take 40 mg by mouth once daily.      potassium chloride SA  (K-DUR,KLOR-CON) 20 MEQ tablet Take 20 mEq by mouth once daily.      theophylline (CAROLINE-24) 100 MG 24 hr capsule Take 100 mg by mouth once daily.

## 2025-05-13 VITALS
RESPIRATION RATE: 19 BRPM | OXYGEN SATURATION: 91 % | TEMPERATURE: 98 F | WEIGHT: 89.5 LBS | HEART RATE: 70 BPM | SYSTOLIC BLOOD PRESSURE: 115 MMHG | BODY MASS INDEX: 16.91 KG/M2 | DIASTOLIC BLOOD PRESSURE: 60 MMHG

## 2025-05-13 PROCEDURE — 25000003 PHARM REV CODE 250: Performed by: NURSE PRACTITIONER

## 2025-05-13 PROCEDURE — 63600175 PHARM REV CODE 636 W HCPCS: Performed by: NURSE PRACTITIONER

## 2025-05-13 PROCEDURE — 25000003 PHARM REV CODE 250: Performed by: HOSPITALIST

## 2025-05-13 PROCEDURE — 27000221 HC OXYGEN, UP TO 24 HOURS

## 2025-05-13 PROCEDURE — 25000003 PHARM REV CODE 250: Performed by: INTERNAL MEDICINE

## 2025-05-13 PROCEDURE — S4991 NICOTINE PATCH NONLEGEND: HCPCS | Performed by: HOSPITALIST

## 2025-05-13 PROCEDURE — 63600175 PHARM REV CODE 636 W HCPCS: Performed by: HOSPITALIST

## 2025-05-13 PROCEDURE — 97162 PT EVAL MOD COMPLEX 30 MIN: CPT

## 2025-05-13 RX ORDER — DILTIAZEM HYDROCHLORIDE 120 MG/1
120 CAPSULE, COATED, EXTENDED RELEASE ORAL DAILY
Qty: 30 CAPSULE | Refills: 11 | Status: SHIPPED | OUTPATIENT
Start: 2025-05-14 | End: 2026-05-14

## 2025-05-13 RX ORDER — LEVOFLOXACIN 750 MG/1
750 TABLET, FILM COATED ORAL DAILY
Qty: 2 TABLET | Refills: 0 | Status: SHIPPED | OUTPATIENT
Start: 2025-05-14 | End: 2025-05-16

## 2025-05-13 RX ORDER — PREDNISONE 20 MG/1
20 TABLET ORAL DAILY
Qty: 4 TABLET | Refills: 0 | Status: SHIPPED | OUTPATIENT
Start: 2025-05-13 | End: 2025-05-17

## 2025-05-13 RX ADMIN — FUROSEMIDE 20 MG: 20 TABLET ORAL at 08:05

## 2025-05-13 RX ADMIN — POTASSIUM CHLORIDE 20 MEQ: 1500 TABLET, EXTENDED RELEASE ORAL at 08:05

## 2025-05-13 RX ADMIN — NICOTINE 1 PATCH: 14 PATCH TRANSDERMAL at 08:05

## 2025-05-13 RX ADMIN — DILTIAZEM HYDROCHLORIDE 120 MG: 120 CAPSULE, COATED, EXTENDED RELEASE ORAL at 08:05

## 2025-05-13 RX ADMIN — FLECAINIDE ACETATE 50 MG: 50 TABLET ORAL at 09:05

## 2025-05-13 RX ADMIN — ASPIRIN 81 MG: 81 TABLET, COATED ORAL at 08:05

## 2025-05-13 RX ADMIN — ISOSORBIDE MONONITRATE 30 MG: 30 TABLET, EXTENDED RELEASE ORAL at 08:05

## 2025-05-13 RX ADMIN — CLOPIDOGREL 75 MG: 75 TABLET ORAL at 08:05

## 2025-05-13 RX ADMIN — PREDNISONE 20 MG: 20 TABLET ORAL at 08:05

## 2025-05-13 RX ADMIN — ENOXAPARIN SODIUM 40 MG: 40 INJECTION SUBCUTANEOUS at 08:05

## 2025-05-13 RX ADMIN — MUPIROCIN: 20 OINTMENT TOPICAL at 08:05

## 2025-05-13 RX ADMIN — PANTOPRAZOLE SODIUM 40 MG: 40 TABLET, DELAYED RELEASE ORAL at 08:05

## 2025-05-13 RX ADMIN — LEVOFLOXACIN 750 MG: 500 TABLET, FILM COATED ORAL at 08:05

## 2025-05-13 NOTE — PROGRESS NOTES
[facility]  Hospital Medicine Progress Note        Chief Complaint: Inpatient Follow-up     HPI:   89-year-old female with past medical history of CAD, atrial fibrillation, COPD, tobacco abuse presents to the emergency room at an outside facility secondary to productive cough and sputum.  Also endorse worsening dyspnea over the last few days.  She reports chest pain with deep inspiration.  Upon arrival to the emergency room she was noted to be in AFib with RVR.  Was transferred to [facility] in the early morning hours of 5/11.  Admitted to hospitalist services.  She was started empirically on Rocephin and azithromycin.  Noted to have an elevated troponin, thought to be type 2 secondary to demand ischemia in the setting of sepsis and AFib with RVR.  Cardiology has been consulted.  The time she arrived at our facility are heart rate is much better controlled.  Currently in the 70s.  Asymptomatic.  Denies any chest pain or palpitations.  Labs without leukocytosis.  Her D-dimer was mildly elevated she had a troponin of 0.169.  BNP was elevated at 2600.  Chest x-ray shows bilateral infiltrates.      Interval Hx:  Patient doing well this morning.  Currently on RA.  She states she does not have oxygen at home and rather not have any.  She is not interested in any type of placement.  She is open to home health services.  Discussed with her that if she gets up with some therapy today and does okay we would potentially let her go home later today     Objective/physical exam:  General: In no acute distress, afebrile  Chest: Clear to auscultation bilaterally  Heart: RRR, +S1, S2, no appreciable murmur  Abdomen: Soft, nontender, BS +  MSK: Warm, no lower extremity edema, no clubbing or cyanosis  Neurologic: Alert and oriented x4, Cranial nerve II-XII intact, Strength 5/5 in all 4 extremities    VITAL SIGNS: 24 HRS MIN & MAX LAST   Temp  Min: 97.4 °F (36.3 °C)  Max: 98 °F (36.7 °C)  97.7 °F (36.5 °C)   BP  Min: 114/65  Max: 151/76 136/69   Pulse  Min: 57  Max: 83  (!) 57   Resp  Min: 17  Max: 20 17   SpO2  Min: 80 %  Max: 97 % 96 %       Recent Labs   Lab 05/10/25  0859 05/11/25  0611 05/12/25  0550   WBC 8.86 6.80 7.96   RBC 3.78* 3.79* 4.07*   HGB 11.1* 10.9* 11.6*   HCT 33.9* 35.6* 37.7   MCV 89.7 93.9 92.6   MCH 29.4 28.8 28.5   MCHC 32.7 30.6* 30.8*   RDW 13.5 13.9 13.6   * 377 404*   MPV 9.7 9.6 9.3       Recent Labs   Lab 05/10/25  0851 05/10/25  0859 05/11/25  0610 05/12/25  0550   NA  --  134* 140 140   K  --  4.5 4.7 5.1   CL  --  104 106 102   CO2  --  25 27 31   BUN  --  19 22.5* 25.8*   CREATININE  --  0.46* 0.60 0.65   GLU  --  124* 120 114   CALCIUM  --  9.6 9.2 9.5   PH 7.371  --   --   --    MG  --   --  2.40  --    ALBUMIN  --  3.7 2.8*  --    PROT  --  6.5 5.7*  --    ALKPHOS  --  101 150  --    ALT  --  15 30  --    AST  --  22 22  --    BILITOT  --  0.6 0.2  --           Microbiology Results (last 7 days)       ** No results found for the last 168 hours. **             Radiology:  Echo    Left Ventricle: The left ventricle is normal in size. Normal wall   thickness. There is normal systolic function with a visually estimated   ejection fraction of 65 - 70%. Grade I diastolic dysfunction.    Right Ventricle: Systolic function is normal. TAPSE is 1.9 cm.    Aortic Valve: Severely calcified cusps. Severely restricted motion.   There is moderate stenosis. Aortic valve area by VTI is 1.1 cm². Aortic   valve peak velocity is 3.6 m/s. Mean gradient is 28 mmHg. The   dimensionless index is 0.39. There is trace aortic regurgitation.    Mitral Valve: Mildly thickened anterior leaflet. There is mitral   annular calcification. The mean pressure gradient across the mitral valve   is 4 mmHg at a heart rate of 69 bpm. There is mild to moderate   regurgitation.    Tricuspid Valve: There is mild to moderate regurgitation.    IVC/SVC: Intermediate venous pressure at 8 mmHg.     Pericardium: There is no pericardial effusion.        Medications:  Scheduled Meds:   aspirin  81 mg Oral Daily    clopidogreL  75 mg Oral Daily    diltiaZEM  120 mg Oral Daily    enoxparin  1 mg/kg Subcutaneous Q12H (prophylaxis, 0900/2100)    flecainide  50 mg Oral Q12H    furosemide  20 mg Oral BID    isosorbide mononitrate  30 mg Oral Daily    levoFLOXacin  750 mg Oral Daily    mupirocin   Nasal BID    nicotine  1 patch Transdermal Daily    pantoprazole  40 mg Oral Daily    potassium chloride SA  20 mEq Oral Daily    predniSONE  20 mg Oral BID     Continuous Infusions:  PRN Meds:.  Current Facility-Administered Medications:     acetaminophen, 1,000 mg, Oral, Q6H PRN    aluminum-magnesium hydroxide-simethicone, 30 mL, Oral, QID PRN    bisacodyL, 10 mg, Rectal, Daily PRN    dextrose 50%, 12.5 g, Intravenous, PRN    dextrose 50%, 25 g, Intravenous, PRN    glucagon (human recombinant), 1 mg, Intramuscular, PRN    glucose, 16 g, Oral, PRN    glucose, 24 g, Oral, PRN    melatonin, 6 mg, Oral, Nightly PRN    naloxone, 0.02 mg, Intravenous, PRN    ondansetron, 4 mg, Intravenous, Q4H PRN    prochlorperazine, 5 mg, Intravenous, Q6H PRN    senna-docusate, 1 tablet, Oral, BID PRN    sodium chloride 0.9%, 10 mL, Intravenous, PRN    Nutrition:  Nutrition consulted. Most recent weight and BMI monitored-     Measurements:  Wt Readings from Last 1 Encounters:   05/11/25 40.6 kg (89 lb 8.1 oz)   Body mass index is 16.91 kg/m².    Patient has been screened and assessed by RD.    Malnutrition Type:  Context:    Level:      Malnutrition Characteristic Summary:       Interventions/Recommendations (treatment strategy):           Assessment/Plan:   Bilateral community-acquired pneumonia   COPD exacerbation   Acute hypoxemia without respiratory distress   AFib with RVR, resolved   Essential hypertension   Coronary artery disease   Tobacco abuse     Levaquin 4 of 5, respiratory status stable   Discontinue theophylline as that has maybe  spurring her AFib.    Continue steroid taper   Echocardiogram pending.    Continue dual antiplatelet therapy with aspirin and Plavix.  Up with therapy today.  Home health   Potentially could go home later today      Wilber Blandon MD   05/13/2025     All diagnosis and differential diagnosis have been reviewed; assessment and plan has been documented; I have personally reviewed the labs and test results that are presently available; I have reviewed the patients medication list; I have reviewed the consulting providers response and recommendations. I have reviewed or attempted to review medical records based upon their availability    All of the patient's questions have been  addressed and answered. Patient's is agreeable to the above stated plan. I will continue to monitor closely and make adjustments to medical management as needed.  _____________________________________________________________________

## 2025-05-13 NOTE — PLAN OF CARE
05/13/25 1523   Final Note   Assessment Type Final Discharge Note   Anticipated Discharge Disposition Home-Health   Hospital Resources/Appts/Education Provided Appointments scheduled and added to AVS   Post-Acute Status   Post-Acute Authorization Home Health   Home Health Status Referrals Sent   Discharge Delays None known at this time

## 2025-05-13 NOTE — PLAN OF CARE
Problem: Adult Inpatient Plan of Care  Goal: Plan of Care Review  Outcome: Progressing  Goal: Patient-Specific Goal (Individualized)  Outcome: Progressing  Goal: Absence of Hospital-Acquired Illness or Injury  Outcome: Progressing  Goal: Optimal Comfort and Wellbeing  Outcome: Progressing  Goal: Readiness for Transition of Care  Outcome: Progressing     Problem: Pneumonia  Goal: Fluid Balance  Outcome: Progressing  Goal: Resolution of Infection Signs and Symptoms  Outcome: Progressing  Goal: Effective Oxygenation and Ventilation  Outcome: Progressing     Problem: Fall Injury Risk  Goal: Absence of Fall and Fall-Related Injury  Outcome: Progressing     Problem: Skin Injury Risk Increased  Goal: Skin Health and Integrity  Outcome: Progressing

## 2025-05-13 NOTE — NURSING
Pt AAOx4, VSS, educated on discharge instructions, new medications, follow up appointments, and signs and symptoms to return to the ED with. All questions answered. Pt verbalized understanding. Peripheral IV discontinued and gauze/tape applied to site with no signs of bleeding or swelling noted. Telemetry monitor discontinued and returned to box. Pt wheeled down via wheelchair to car with family.

## 2025-05-13 NOTE — PLAN OF CARE
Problem: Physical Therapy  Goal: Physical Therapy Goal  Description: Goals to be met by: 25     Patient will increase functional independence with mobility by performin. Supine to sit with Set-up [place]  2. Sit to supine with Set-up [place]  3. Sit to stand transfer with Supervision  4. Gait  x 200 feet with Supervision using Rolling Walker.     Outcome: Progressing

## 2025-05-13 NOTE — PLAN OF CARE
Problem: Adult Inpatient Plan of Care  Goal: Plan of Care Review  5/13/2025 1633 by Sonja Poe RN  Outcome: Progressing  5/13/2025 1142 by Sonja Poe RN  Outcome: Progressing  Goal: Patient-Specific Goal (Individualized)  5/13/2025 1633 by Sonja Poe RN  Outcome: Progressing  5/13/2025 1142 by Sonja Poe RN  Outcome: Progressing  Goal: Absence of Hospital-Acquired Illness or Injury  5/13/2025 1633 by Sonja Poe RN  Outcome: Progressing  5/13/2025 1142 by Sonja Poe RN  Outcome: Progressing  Goal: Optimal Comfort and Wellbeing  5/13/2025 1633 by Sonja Poe RN  Outcome: Progressing  5/13/2025 1142 by Sonja Poe RN  Outcome: Progressing  Goal: Readiness for Transition of Care  5/13/2025 1633 by Sonja Poe RN  Outcome: Progressing  5/13/2025 1142 by Sonja Poe RN  Outcome: Progressing     Problem: Pneumonia  Goal: Fluid Balance  5/13/2025 1633 by Sonja Poe RN  Outcome: Progressing  5/13/2025 1142 by Sonja Poe RN  Outcome: Progressing  Goal: Resolution of Infection Signs and Symptoms  5/13/2025 1633 by Sonja Poe RN  Outcome: Progressing  5/13/2025 1142 by Sonja Poe RN  Outcome: Progressing  Goal: Effective Oxygenation and Ventilation  5/13/2025 1633 by Sonja Poe RN  Outcome: Progressing  5/13/2025 1142 by Sonja Poe RN  Outcome: Progressing     Problem: Wound  Goal: Optimal Coping  5/13/2025 1633 by Sonja Poe RN  Outcome: Progressing  5/13/2025 1142 by Sonja Poe RN  Outcome: Progressing  Goal: Optimal Functional Ability  5/13/2025 1633 by Sonja Poe RN  Outcome: Progressing  5/13/2025 1142 by Sonja Poe RN  Outcome: Progressing  Goal: Absence of Infection Signs and Symptoms  5/13/2025 1633 by Sonja Poe RN  Outcome: Progressing  5/13/2025 1142 by Lui, Sonja, RN  Outcome: Progressing  Goal: Improved Oral Intake  5/13/2025 1633 by Sonja Poe RN  Outcome: Progressing  5/13/2025 1142 by Sonja Poe,  RN  Outcome: Progressing  Goal: Optimal Pain Control and Function  5/13/2025 1633 by Sonja Poe RN  Outcome: Progressing  5/13/2025 1142 by Sonja Poe RN  Outcome: Progressing  Goal: Skin Health and Integrity  5/13/2025 1633 by Sonja Poe RN  Outcome: Progressing  5/13/2025 1142 by Sonja Poe RN  Outcome: Progressing  Goal: Optimal Wound Healing  5/13/2025 1633 by Sonja Poe RN  Outcome: Progressing  5/13/2025 1142 by Sonja Poe RN  Outcome: Progressing     Problem: Fall Injury Risk  Goal: Absence of Fall and Fall-Related Injury  5/13/2025 1633 by Sonja Poe RN  Outcome: Progressing  5/13/2025 1142 by Sonja Poe RN  Outcome: Progressing     Problem: Skin Injury Risk Increased  Goal: Skin Health and Integrity  5/13/2025 1633 by Sonja Poe RN  Outcome: Progressing  5/13/2025 1142 by Sonja Poe RN  Outcome: Progressing

## 2025-05-13 NOTE — DISCHARGE SUMMARY
[facility]  Hospital Medicine Discharge Summary    Admit Date: 5/11/2025  Discharge Date and Time: 5/13/20253:01 PM  Admitting Physician: Hospitalist team   Discharging Physician: Wilber Blandon MD.  Primary Care Physician: Sj Paige MD      Discharge Diagnoses:  Bilateral community-acquired pneumonia   COPD exacerbation   Acute hypoxemia without respiratory distress   AFib with RVR, resolved   Essential hypertension   Coronary artery disease   Tobacco abuse    Hospital Course:   89-year-old female with past medical history of CAD, atrial fibrillation, COPD, tobacco abuse presents to the emergency room at an outside facility secondary to productive cough and sputum. Also endorse worsening dyspnea over the last few days. She reports chest pain with deep inspiration. Upon arrival to the emergency room she was noted to be in AFib with RVR. Was transferred to [facility] in the early morning hours of 5/11. Admitted to hospitalist services. She was started empirically on Rocephin and azithromycin. Noted to have an elevated troponin, thought to be type 2 secondary to demand ischemia in the setting of sepsis and AFib with RVR. Cardiology has been consulted. The time she arrived at our facility are heart rate is much better controlled. Currently in the 70s. Asymptomatic. Denies any chest pain or palpitations. Labs without leukocytosis. Her D-dimer was mildly elevated she had a troponin of 0.169. BNP was elevated at 2600. Chest x-ray shows bilateral infiltrates.     Patient was started empirically on oral Levaquin.  Oxygen was weaned down.  She seems to be back at her functional baseline.  She did have some AFib.  Cardiology evaluated in change her to oral diltiazem.  Recommended discontinuing her theophylline.  Will follow up in clinic.  Can continue with flecainide, Cardizem, aspirin, and Plavix.  She is feeling well and anxious to go home.  PT and OT evaluated.   Recommending home health services for continued therapy.    Vitals:  Blood pressure 131/75, pulse 68, temperature 97.7 °F (36.5 °C), temperature source Oral, resp. rate 17, weight 40.6 kg (89 lb 8.1 oz), SpO2 96%..    Physical Exam:  Awake, Alert, Oriented x 3, No new focal Neurologic deficit, Normal Affect  NC/AT, PERRLA, EOMI  Supple neck, no JVD, No cervical lymphadenopathy  Symmetrical chest, Good air entry bilaterally. No rhonchi, wheezes, crackles appreciated  RRR, No gallop, rub or murmur  +ve Bowel sounds x4, Abd soft and non tender, no rebound, guarding or rigidity  No Cyanosis, cludding or edema, No new rash or bruises    Procedures Performed: No admission procedures for hospital encounter.     Significant Diagnostic Studies: See Full reports for all details  Admission on 05/11/2025   Component Date Value    Troponin-I 05/11/2025 0.053 (H)     Natriuretic Peptide 05/11/2025 724.9 (H)     Influenza A PCR 05/11/2025 Not Detected     Influenza B PCR 05/11/2025 Not Detected     Respiratory Syncytial Vi* 05/11/2025 Not Detected     SARS-CoV-2 PCR 05/11/2025 Not Detected     Moeller's Biplane MOD Ej* 05/11/2025 67     A2C EF 05/11/2025 68     A4C EF 05/11/2025 66     LVOT stroke volume 05/11/2025 86.1     LVIDd 05/11/2025 3.5     LV Systolic Volume 05/11/2025 18     LVIDs 05/11/2025 2.3     LV ESV A2C 05/11/2025 61.40     LV Diastolic Volume 05/11/2025 51     LV ESV A4C 05/11/2025 50.00     LV EDV A2C 05/11/2025 69.5     LV EDV A4C 05/11/2025 68.60     Left Ventricular End Sys* 05/11/2025 18.10     Left Ventricular End Samantha* 05/11/2025 50.90     IVS 05/11/2025 0.9     LVOT diameter 05/11/2025 1.9     LVOT area 05/11/2025 2.8     FS 05/11/2025 34.3     Left Ventricle Relative * 05/11/2025 0.63     PW 05/11/2025 1.1     LV mass 05/11/2025 103.4     MV Peak E Lele 05/11/2025 0.94     TDI LATERAL 05/11/2025 0.08     TDI SEPTAL 05/11/2025 0.10     E/E' ratio 05/11/2025 10     MV Peak A Lele 05/11/2025 1.26     TR Max  Lele 05/11/2025 3.3     E/A ratio 05/11/2025 0.75     E wave deceleration time 05/11/2025 261     LV SEPTAL E/E' RATIO 05/11/2025 9.4     LV LATERAL E/E' RATIO 05/11/2025 11.8     LVOT peak lele 05/11/2025 1.4     Left Ventricular Outflow* 05/11/2025 0.91     Left Ventricular Outflow* 05/11/2025 4.00     TAPSE 05/11/2025 1.9     LA size 05/11/2025 3.9     LA Vol (MOD) 05/11/2025 56     Vn Nyquist MS 05/11/2025 0.31     AV mean gradient 05/11/2025 28     AV peak gradient 05/11/2025 52     Ao peak lele 05/11/2025 3.6     Ao VTI 05/11/2025 78.2     LVOT peak VTI 05/11/2025 30.4     AV valve area 05/11/2025 1.1     AV Velocity Ratio 05/11/2025 0.39     AV index (prosthetic) 05/11/2025 0.39     THEA by Velocity Ratio 05/11/2025 1.1     Radius 05/11/2025 0.70     Vn Nyquist 05/11/2025 0.31     MV mean gradient 05/11/2025 4     MV peak gradient 05/11/2025 8     MV stenosis pressure 1/2* 05/11/2025 78.00     MV valve area p 1/2 meth* 05/11/2025 2.82     MV valve area by continu* 05/11/2025 2.06     MV VTI 05/11/2025 41.8     Triscuspid Valve Regurgi* 05/11/2025 44     Mean e' 05/11/2025 0.09     LA area A4C 05/11/2025 19.20     LA area A2C 05/11/2025 19.90     Mitral Valve Heart Rate 05/11/2025 69     TV resting pulmonary art* 05/11/2025 52     RV TB RVSP 05/11/2025 11     Est. RA pres 05/11/2025 8     Sodium 05/11/2025 140     Potassium 05/11/2025 4.7     Chloride 05/11/2025 106     CO2 05/11/2025 27     Glucose 05/11/2025 120     Blood Urea Nitrogen 05/11/2025 22.5 (H)     Creatinine 05/11/2025 0.60     Calcium 05/11/2025 9.2     Protein Total 05/11/2025 5.7 (L)     Albumin 05/11/2025 2.8 (L)     Globulin 05/11/2025 2.9     Albumin/Globulin Ratio 05/11/2025 1.0 (L)     Bilirubin Total 05/11/2025 0.2     ALP 05/11/2025 150     ALT 05/11/2025 30     AST 05/11/2025 22     eGFR 05/11/2025 >60     Anion Gap 05/11/2025 7.0     BUN/Creatinine Ratio 05/11/2025 38     Magnesium Level 05/11/2025 2.40     WBC 05/11/2025 6.80     RBC  05/11/2025 3.79 (L)     Hgb 05/11/2025 10.9 (L)     Hct 05/11/2025 35.6 (L)     MCV 05/11/2025 93.9     MCH 05/11/2025 28.8     MCHC 05/11/2025 30.6 (L)     RDW 05/11/2025 13.9     Platelet 05/11/2025 377     MPV 05/11/2025 9.6     Neut % 05/11/2025 85.8     Lymph % 05/11/2025 4.9     Mono % 05/11/2025 8.8     Eos % 05/11/2025 0.0     Basophil % 05/11/2025 0.1     Imm Grans % 05/11/2025 0.4     Neut # 05/11/2025 5.83     Lymph # 05/11/2025 0.33 (L)     Mono # 05/11/2025 0.60     Eos # 05/11/2025 0.00     Baso # 05/11/2025 0.01     Imm Gran # 05/11/2025 0.03     NRBC% 05/11/2025 0.0     Troponin-I 05/11/2025 0.086 (H)     Troponin-I 05/11/2025 0.063 (H)     Sodium 05/12/2025 140     Potassium 05/12/2025 5.1     Chloride 05/12/2025 102     CO2 05/12/2025 31     Glucose 05/12/2025 114     Blood Urea Nitrogen 05/12/2025 25.8 (H)     Creatinine 05/12/2025 0.65     BUN/Creatinine Ratio 05/12/2025 40     Calcium 05/12/2025 9.5     Anion Gap 05/12/2025 7.0     eGFR 05/12/2025 >60     WBC 05/12/2025 7.96     RBC 05/12/2025 4.07 (L)     Hgb 05/12/2025 11.6 (L)     Hct 05/12/2025 37.7     MCV 05/12/2025 92.6     MCH 05/12/2025 28.5     MCHC 05/12/2025 30.8 (L)     RDW 05/12/2025 13.6     Platelet 05/12/2025 404 (H)     MPV 05/12/2025 9.3     Neut % 05/12/2025 90.2     Lymph % 05/12/2025 3.9     Mono % 05/12/2025 5.3     Eos % 05/12/2025 0.0     Basophil % 05/12/2025 0.1     Imm Grans % 05/12/2025 0.5     Neut # 05/12/2025 7.18     Lymph # 05/12/2025 0.31 (L)     Mono # 05/12/2025 0.42     Eos # 05/12/2025 0.00     Baso # 05/12/2025 0.01     Imm Gran # 05/12/2025 0.04     NRBC% 05/12/2025 0.0         Microbiology Results (last 7 days)       ** No results found for the last 168 hours. **             Echo  Result Date: 5/11/2025    Left Ventricle: The left ventricle is normal in size. Normal wall thickness. There is normal systolic function with a visually estimated ejection fraction of 65 - 70%. Grade I diastolic dysfunction.    Right Ventricle: Systolic function is normal. TAPSE is 1.9 cm.   Aortic Valve: Severely calcified cusps. Severely restricted motion. There is moderate stenosis. Aortic valve area by VTI is 1.1 cm². Aortic valve peak velocity is 3.6 m/s. Mean gradient is 28 mmHg. The dimensionless index is 0.39. There is trace aortic regurgitation.   Mitral Valve: Mildly thickened anterior leaflet. There is mitral annular calcification. The mean pressure gradient across the mitral valve is 4 mmHg at a heart rate of 69 bpm. There is mild to moderate regurgitation.   Tricuspid Valve: There is mild to moderate regurgitation.   IVC/SVC: Intermediate venous pressure at 8 mmHg.   Pericardium: There is no pericardial effusion.     CTA Chest Non-Coronary (PE Studies)  Result Date: 5/10/2025  EXAMINATION: CTA CHEST NON CORONARY (PE STUDIES) CPT: 69517 CLINICAL HISTORY: Pulmonary embolism (PE) suspected, positive D-dimer;. TECHNIQUE: Axial CTA slices through the chest obtained after the administration of intravenous contrast. Coronal and sagittal MIPS were created. Total DLP for the study is approximately 127.1 mGy-cm. Automated exposure control was utilized. COMPARISON: None FINDINGS: No evidence of central or segmental pulmonary arterial filling defects to suggest central or segmental pulmonary embolus is visualized.  The heart appears to be enlarged with right atrial predominance.  Atherosclerotic vascular calcifications are seen including scattered coronary and aortic calcifications.  Trace pericardial effusion is seen.  There appears to be borderline subcarinal lymph node measuring 9-10 mm in short axis dimension.  There are additional small but somewhat prolific mediastinal lymph nodes.  These lymph nodes are indeterminate, possibly reactive.  There appears to be a 12 mm in short axis dimension right hilar lymph node.  Subcentimeter in short axis dimension left hilar and bilateral axillary lymph nodes are noted.  Small right larger than  left bilateral dependent pleural effusions are seen.  There appear to be airspace opacities in the adjacent bilateral lower lobes with volume loss which may reflect compressive atelectasis, but underlying infiltrate cannot be excluded.  There are multifocal reticulonodular opacities in the bilateral lower lobes, right greater than left upper lobes, and right middle lobe which may reflect sequelae of infectious or inflammatory processes.  There is a more confluent area of streaky airspace disease with air bronchograms in the anterior right middle lobe and more confluent airspace disease in the lateral right lung base which may reflect infectious or inflammatory process.  Follow-up to ensure resolution is recommended.  There are streaky opacities in the bilateral lung bases which may reflect atelectasis.  No visible pneumothorax is appreciated.  The central airways appear grossly patent.  Calcified nodule in the right middle lobe is noted.  No evidence of pneumothorax is appreciated.  There appears to be evidence of moderate-sized Rissa off a gel hiatal hernia.  The included upper abdomen demonstrates no definite acute abnormalities.  Upper abdominal atherosclerosis is seen.  Small splenic calcification is seen which may reflect prior granulomatous disease.  The visualized osseous structures appear demineralized.  Degenerative changes affect the visualized spine.  Moderate superior endplate L1 compression fracture deformity of indeterminate chronicity is seen.  No obvious acute appearing fracture lucencies or paravertebral hematoma are appreciated at this time.     1. No evidence of central or segmental pulmonary arterial filling defects to suggest central or segmental pulmonary embolus is visualized. 2. Small pericardial effusion is seen.  Cardiomegaly is noted. 3. There appear to be airspace opacities in the bilateral lower lobes adjacent to the pleural effusions which may be related to compressive atelectasis, but  underlying infiltrate cannot be excluded.  There are multifocal reticulonodular opacities in the bilateral lungs which may reflect infectious/inflammatory process.  Follow-up with CT in 3 months is recommended. 4. There are more confluent streaky airspace opacities in the right middle lobe and lateral right lung base which may be related to infectious/inflammatory process.  Attention on follow-up to ensure resolution is recommended. 5. Borderline subcarinal lymph node is seen along with additional small but somewhat prolific mediastinal lymph nodes.  Mildly enlarged right hilar lymph node is seen.  These lymph nodes are indeterminate, possibly reactive.  Recommend attention on follow-up. 6. Additional findings and details as above. Electronically signed by: Hector Xie MD Date:    05/10/2025 Time:    11:47    X-Ray Chest 1 View  Result Date: 5/10/2025  EXAMINATION: XR CHEST 1 VIEW CLINICAL HISTORY: shortness of breath; TECHNIQUE: Single frontal view of the chest was performed. COMPARISON: December 13, 2024 FINDINGS: Heart size remains enlarged.  There has been interval development of bilateral infiltrates and small effusions, right greater than left.  No pneumothorax is seen.  Emphysematous changes are again noted.     Stable cardiomegaly is noted. Bilateral basilar infiltrates and small effusions are now present, right greater than left. Electronically signed by: Chapo Golden Date:    05/10/2025 Time:    08:47  - pulls last radiology orders        Medication List        START taking these medications      diltiaZEM 120 MG Cp24  Commonly known as: CARDIZEM CD  Take 1 capsule (120 mg total) by mouth once daily.  Start taking on: May 14, 2025     levoFLOXacin 750 MG tablet  Commonly known as: LEVAQUIN  Take 1 tablet (750 mg total) by mouth once daily. for 2 days  Start taking on: May 14, 2025     predniSONE 20 MG tablet  Commonly known as: DELTASONE  Take 1 tablet (20 mg total) by mouth once daily. for 4 days             CONTINUE taking these medications      aspirin 81 MG EC tablet  Commonly known as: ECOTRIN     clopidogreL 75 mg tablet  Commonly known as: PLAVIX     flecainide 50 MG Tab  Commonly known as: TAMBOCOR     furosemide 20 MG tablet  Commonly known as: LASIX     IRON 100 PLUS ORAL     isosorbide mononitrate 30 MG 24 hr tablet  Commonly known as: IMDUR     pantoprazole 40 MG tablet  Commonly known as: PROTONIX     potassium chloride SA 20 MEQ tablet  Commonly known as: K-DUR,KLOR-CON            STOP taking these medications      theophylline 100 MG 24 hr capsule  Commonly known as: CAROLINE-24               Where to Get Your Medications        These medications were sent to [facility] - [place] - [address]  [address] [facility], [place] [address]      Phone: [phone]   diltiaZEM 120 MG Cp24  levoFLOXacin 750 MG tablet  predniSONE 20 MG tablet          Explained in detail to the patient about the discharge plan, medications, and follow-up visits. Pt understands and agrees with the treatment plan  Discharged Condition: stable  Diet: cardiac  Disposition: home with home health    Medications Per [place] med rec  Activities as tolerated  Follow up with your PCP in 2 wks   For further questions contact hospitalist office    Discharge time 33 minutes    For worsening symptoms, chest pain, shortness of breath, increased abdominal pain, high grade fever, stroke or stroke like symptoms, immediately go to the nearest Emergency Room or call 911 as soon as possible.        Wilber De Oliveira M.D on 5/13/2025. at 3:01 PM.

## 2025-05-13 NOTE — PROGRESS NOTES
[facility]    Cardiology  Progress Note    Patient Name: Stephanie Gutierrez  MRN: [id number]  Admission Date: 5/11/2025  Hospital Length of Stay: 2 days  Code Status: Full Code   Attending Provider: Wilber Blandon MD   Consulting Provider: JOSE LUIS Walsh  Primary Care Physician: Sj Paige MD  Principal Problem:<principal problem not specified>    Patient information was obtained from patient, past medical records, and ER records.     Subjective:   Chief Complaint/Reason for Consult: AF RVR & Abnormal Troponin    HPI:   Ms. Gutierrez is a 89y/o female, unknown to [facility], who presented to ED with c/o productive cough, worsening SINGH, and chest pain with deep inspiration.Labs significant for Troponin 0.072-S0.130-0.169-0.053. pBNP 2670., . Flu/COVID negative. CXR revealing bibasilar infiltrates with small effusions. CTA chest negative PE but again reveals bibasilar opacities. She was found to be in Afib RVR; therefore she was seen through [facility] telecardiology with recommendations for resuming flecainide and transfer to facility with cardiology services if substantial rise in troponin. She has been transferred to [facility] with admit to hospital medicine. [facility] consulted for NSTEMI/Afib RVR. She is currently SR    Hospital Course:  5.12.25: NAD Noted. AF RVR. BP Stable.   5.13.25: NAD Noted. Vitals Stable. SR/SB on Tele.     PMH:  CAD, PAF, COPD  PSH:  LHC/PCI, appendectomy, cholecystectomy, hiatal hernia repair, mastectomy, hysterectomy  Family History:  Unknown  Social History:  Current smoker    Previous Cardiac Diagnostics:   Echocardiogram (5.11.25):  Left Ventricle: The left ventricle is normal in size. Normal wall thickness. There is normal systolic function with a visually estimated ejection fraction of 65 - 70%. Grade I diastolic dysfunction.  Right Ventricle: Systolic function is normal. TAPSE is 1.9 cm.  Aortic Valve: Severely calcified cusps. Severely restricted motion. There is  moderate stenosis. Aortic valve area by VTI is 1.1 cm². Aortic valve peak velocity is 3.6 m/s. Mean gradient is 28 mmHg. The dimensionless index is 0.39. There is trace aortic regurgitation.  Mitral Valve: Mildly thickened anterior leaflet. There is mitral annular calcification. The mean pressure gradient across the mitral valve is 4 mmHg at a heart rate of 69 bpm. There is mild to moderate regurgitation.  Tricuspid Valve: There is mild to moderate regurgitation.  IVC/SVC: Intermediate venous pressure at 8 mmHg.  Pericardium: There is no pericardial effusion.    Review of patient's allergies indicates:  No Known Allergies  No current facility-administered medications on file prior to encounter.     Current Outpatient Medications on File Prior to Encounter   Medication Sig    aspirin (ECOTRIN) 81 MG EC tablet Take 81 mg by mouth once daily.    clopidogreL (PLAVIX) 75 mg tablet Take 75 mg by mouth once daily.    flecainide (TAMBOCOR) 50 MG Tab Take 50 mg by mouth every 12 (twelve) hours.    furosemide (LASIX) 20 MG tablet Take 20 mg by mouth 2 (two) times daily.    iron,carb/vit C/vit B12/folic (IRON 100 PLUS ORAL) Take 1 tablet by mouth once daily.    isosorbide mononitrate (IMDUR) 30 MG 24 hr tablet Take 30 mg by mouth once daily.    pantoprazole (PROTONIX) 40 MG tablet Take 40 mg by mouth once daily.    potassium chloride SA (K-DUR,KLOR-CON) 20 MEQ tablet Take 20 mEq by mouth once daily.    theophylline (CAROLINE-24) 100 MG 24 hr capsule Take 100 mg by mouth once daily.     Review of Systems   Respiratory:  Negative for shortness of breath.    Cardiovascular:  Negative for chest pain and leg swelling.     Objective:     Vital Signs (Most Recent):  Temp: 97.7 °F (36.5 °C) (05/13/25 0709)  Pulse: (!) 57 (05/13/25 0709)  Resp: 17 (05/13/25 0709)  BP: 136/69 (05/13/25 0709)  SpO2: 96 % (05/13/25 0709) Vital Signs (24h Range):  Temp:  [97.4 °F (36.3 °C)-97.9 °F (36.6 °C)] 97.7 °F (36.5 °C)  Pulse:  [57-83] 57  Resp:   "[17-20] 17  SpO2:  [80 %-97 %] 96 %  BP: (114-149)/(62-75) 136/69   Weight: 40.6 kg (89 lb 8.1 oz)  Body mass index is 16.91 kg/m².  SpO2: 96 %       Intake/Output Summary (Last 24 hours) at 5/13/2025 0752  Last data filed at 5/13/2025 0633  Gross per 24 hour   Intake 640 ml   Output 1650 ml   Net -1010 ml     Lines/Drains/Airways       None                 Significant Labs:   Chemistries:   Recent Labs   Lab 05/10/25  0859 05/10/25  1058 05/10/25  1240 05/11/25  0610 05/11/25  0953 05/11/25  1523 05/12/25  0550   *  --   --  140  --   --  140   K 4.5  --   --  4.7  --   --  5.1     --   --  106  --   --  102   CO2 25  --   --  27  --   --  31   BUN 19  --   --  22.5*  --   --  25.8*   CREATININE 0.46*  --   --  0.60  --   --  0.65   CALCIUM 9.6  --   --  9.2  --   --  9.5   PROT 6.5  --   --  5.7*  --   --   --    BILITOT 0.6  --   --  0.2  --   --   --    ALKPHOS 101  --   --  150  --   --   --    ALT 15  --   --  30  --   --   --    AST 22  --   --  22  --   --   --    MG  --   --   --  2.40  --   --   --    TROPONINI 0.072* 0.130* 0.169* 0.053* 0.086* 0.063*  --         CBC/Anemia Labs: Coags:    Recent Labs   Lab 05/10/25  0859 05/11/25  0611 05/12/25  0550   WBC 8.86 6.80 7.96   HGB 11.1* 10.9* 11.6*   HCT 33.9* 35.6* 37.7   * 377 404*   MCV 89.7 93.9 92.6   RDW 13.5 13.9 13.6    No results for input(s): "PT", "INR", "APTT" in the last 168 hours.       Telemetry: Sinus Rhythm     Physical Exam  Vitals and nursing note reviewed.   Constitutional:       General: She is not in acute distress.     Comments: Frail Appearing   Cardiovascular:      Rate and Rhythm: Normal rate and regular rhythm.   Pulmonary:      Effort: Pulmonary effort is normal. No respiratory distress.      Breath sounds: No wheezing.   Musculoskeletal:      Right lower leg: No edema.      Left lower leg: No edema.   Skin:     General: Skin is warm and dry.   Neurological:      Mental Status: She is alert.       Home " Medications:   Medications Ordered Prior to Encounter[1]  Current Schedule Inpatient Medications:   aspirin  81 mg Oral Daily    clopidogreL  75 mg Oral Daily    diltiaZEM  120 mg Oral Daily    enoxparin  1 mg/kg Subcutaneous Q12H (prophylaxis, 0900/2100)    flecainide  50 mg Oral Q12H    furosemide  20 mg Oral BID    isosorbide mononitrate  30 mg Oral Daily    levoFLOXacin  750 mg Oral Daily    mupirocin   Nasal BID    nicotine  1 patch Transdermal Daily    pantoprazole  40 mg Oral Daily    potassium chloride SA  20 mEq Oral Daily    predniSONE  20 mg Oral BID       Assessment:   NSTEMI Type II due to AF RVR & Pneumonia    - Denies Angina  PAF- Now SR    - Sounds like she refused OAC in the past (is on DAPT due to prior PCI years ago) (History of Being on Warfarin)    - EF 65-70%  VHD    - AS: Moderate, MR: Mild to Moderate, TR: Mild to Moderate  Elevated BNP (724)  B Pneumonia  CAD  COPD  RBBB  Anemia  Tobacco dependence    Plan:   Continue Cardizem  Mg Daily  Continue Flecainde 50 Mg PO BID  Continue Plavix and aspirin  Follow up with Dr. Mahoney for further discussions regarding initiation of DOAC.  K+ Goal 4 Mag Goal 2  Outpatient Aortic Valve Surveillance as per Dr. Mahoney.  Stable from [facility] Standpoint. Will be available.     Jayne Torres, JOSE LUIS  Cardiology  [facility]          [1]   No current facility-administered medications on file prior to encounter.     Current Outpatient Medications on File Prior to Encounter   Medication Sig Dispense Refill    aspirin (ECOTRIN) 81 MG EC tablet Take 81 mg by mouth once daily.      clopidogreL (PLAVIX) 75 mg tablet Take 75 mg by mouth once daily.      flecainide (TAMBOCOR) 50 MG Tab Take 50 mg by mouth every 12 (twelve) hours.      furosemide (LASIX) 20 MG tablet Take 20 mg by mouth 2 (two) times daily.      iron,carb/vit C/vit B12/folic (IRON 100 PLUS ORAL) Take 1 tablet by mouth once daily.      isosorbide mononitrate (IMDUR) 30 MG 24  hr tablet Take 30 mg by mouth once daily.      pantoprazole (PROTONIX) 40 MG tablet Take 40 mg by mouth once daily.      potassium chloride SA (K-DUR,KLOR-CON) 20 MEQ tablet Take 20 mEq by mouth once daily.      theophylline (CAROLINE-24) 100 MG 24 hr capsule Take 100 mg by mouth once daily.

## 2025-05-13 NOTE — PT/OT/SLP EVAL
Physical Therapy Evaluation    Patient Name:  Stephanie Gutierrez   MRN:  [id number]    Recommendations:     Discharge therapy intensity:  (low intensity therapy with significant family assistance vs MOD intensity therapy)   Discharge Equipment Recommendations: none   Barriers to discharge: None    Assessment:     Stephanie Gutierrez is a 89 y.o. female admitted with a productive cough. Prior to admit, patient lived alone in a [facility] with 1 step to enter. Family provides assistance throughout the day. She ambulates with a RW at baseline.  She presents with the following impairments/functional limitations: weakness, impaired endurance, impaired self care skills, impaired functional mobility, gait instability, impaired balance, decreased safety awareness. Patient sitting EOB at beginning of session. She required MIN A for sit<>stand. Ambulated 35 ft with RW & CGA. Patient with slow gait speed with anterior trunk lean, which makes her a fall risk. Recommending LOW intensity therapy with significant family assistance vs MOD intensity therapy at discharge. Progress as tolerated.     Rehab Prognosis: Good; patient would benefit from acute skilled PT services to address these deficits and reach maximum level of function.    Recent Surgery: * No surgery found *      Plan:     During this hospitalization, patient would benefit from acute PT services 5 x/week to address the identified rehab impairments via gait training, therapeutic activities, therapeutic exercises, neuromuscular re-education and progress toward the following goals:    Plan of Care Expires:  06/13/25    Subjective     Chief Complaint: none  Patient/Family Comments/goals: none  Pain/Comfort:  Pain Rating 1: 0/10    Patients cultural, spiritual, Roman [employer] conflicts given the current situation: no    Living Environment:  Prior to admit, patient lived alone in a [facility] with 1 step to enter. Family provides assistance throughout the day. She ambulates with a RW at baseline.   Equipment used at home: nebulizer, walker, rolling, wheelchair, bath bench.  DME owned (not currently used): none.  Upon discharge, patient will have assistance from [facility].    Objective:     Communicated with nurse prior to session.  Patient found supine with telemetry, peripheral IV, PureWick  upon PT entry to room.    General Precautions: Standard, fall  Orthopedic Precautions:N/A   Braces: N/A  Respiratory Status: Room air    Exams:  Cognitive Exam:  Patient is oriented to Person, Place, Time, and Situation  Sensation: -       Intact  RLE ROM: WFL  RLE Strength: -4/5 grossly  LLE ROM: WFL  LLE Strength: -4/5 grossly  Skin integrity: Visible skin intact      Functional Mobility:  Transfers:  Sit to Stand:  minimum assistance with rolling walker  Gait: 35 ft with RW & CGA. Patient with slow gait speed with anterior trunk lean, which makes her a fall risk.   Balance: fair/poor      AM-PAC 6 CLICK MOBILITY  Total Score:18     Education Provided:  Role and goals of PT, transfer training, bed mobility, gait training, balance training, safety awareness, assistive device, strengthening exercises, and importance of participating in PT to return to PLOF.    Patient left up in chair with all lines intact, call button in reach, and educated on calling for assistance when ready to return to bed.    GOALS:   Multidisciplinary Problems       Physical Therapy Goals          Problem: Physical Therapy    Goal Priority Disciplines Outcome Interventions   Physical Therapy Goal     PT, PT/OT Progressing    Description: Goals to be met by: 25     Patient will increase functional independence with mobility by performin. Supine to sit with Set-up [place]  2. Sit to supine with Set-up [place]  3. Sit to stand transfer with Supervision  4. Gait  x 200 feet with Supervision using Rolling Walker.                          History:     Past Medical History:   Diagnosis Date    COPD (chronic obstructive pulmonary disease)      Femur fracture, right     Paroxysmal atrial fibrillation        Past Surgical History:   Procedure Laterality Date    APPENDECTOMY      CHOLECYSTECTOMY      CORONARY ANGIOPLASTY WITH STENT PLACEMENT      HIATAL HERNIA REPAIR      HYSTERECTOMY      MASTECTOMY Bilateral        Time Tracking:     PT Received On: 05/13/25  PT Start Time: 0835     PT Stop Time: 0855  PT Total Time (min): 20 min     Billable Minutes: Evaluation 20 minutes      05/13/2025

## 2025-05-14 ENCOUNTER — PATIENT OUTREACH (OUTPATIENT)
Dept: ADMINISTRATIVE | Facility: CLINIC | Age: OVER 89
End: 2025-05-14
Payer: MEDICARE

## 2025-05-14 NOTE — PROGRESS NOTES
C3 nurse attempted to contact Stephanie Gutierrez for a TCC post hospital discharge follow up call. No answer. LVM requesting a callback at [phone].    The patient does not have a scheduled HOSFU appointment. Message sent to PCP's staff to assist with HOSFU appointment scheduling.

## 2025-05-15 ENCOUNTER — TELEPHONE (OUTPATIENT)
Dept: ADMINISTRATIVE | Facility: CLINIC | Age: OVER 89
End: 2025-05-15
Payer: MEDICARE

## 2025-05-15 NOTE — PROGRESS NOTES
3rd attempt- C3 nurse attempted to contact patient for a TCC post hospital discharge follow-up call. The patient relative Mindy declined call at this time.

## 2025-05-15 NOTE — PROGRESS NOTES
2nd attempt made to reach patient for TCC call. Left voicemail please call [phone] leave first name, last, and date of birth for Juli. I will return your call.

## 2025-05-15 NOTE — TELEPHONE ENCOUNTER
"Phoned patient in response to reply of "2" to post-discharge texting tracker. Ms. Gutierrez states that she does not have any questions or concerns at this time. She is not in need of assistance.            "

## 2025-05-16 LAB
BACTERIA BLD CULT: NORMAL
BACTERIA BLD CULT: NORMAL

## 2025-05-20 ENCOUNTER — TELEPHONE (OUTPATIENT)
Dept: ADMINISTRATIVE | Facility: CLINIC | Age: OVER 89
End: 2025-05-20
Payer: MEDICARE

## 2025-07-15 ENCOUNTER — LAB REQUISITION (OUTPATIENT)
Dept: LAB | Facility: HOSPITAL | Age: OVER 89
End: 2025-07-15
Payer: MEDICARE

## 2025-07-15 DIAGNOSIS — I25.10 ATHEROSCLEROTIC HEART DISEASE OF NATIVE CORONARY ARTERY WITHOUT ANGINA PECTORIS: ICD-10-CM

## 2025-07-15 DIAGNOSIS — E78.5 HYPERLIPIDEMIA, UNSPECIFIED: ICD-10-CM

## 2025-07-15 DIAGNOSIS — D51.0 VITAMIN B12 DEFICIENCY ANEMIA DUE TO INTRINSIC FACTOR DEFICIENCY: ICD-10-CM

## 2025-07-15 DIAGNOSIS — I48.91 UNSPECIFIED ATRIAL FIBRILLATION: ICD-10-CM

## 2025-07-15 DIAGNOSIS — J44.9 CHRONIC OBSTRUCTIVE PULMONARY DISEASE, UNSPECIFIED: ICD-10-CM

## 2025-07-15 DIAGNOSIS — R30.0 DYSURIA: ICD-10-CM

## 2025-07-15 DIAGNOSIS — E55.9 VITAMIN D DEFICIENCY, UNSPECIFIED: ICD-10-CM

## 2025-07-15 DIAGNOSIS — E03.9 HYPOTHYROIDISM, UNSPECIFIED: ICD-10-CM

## 2025-07-15 DIAGNOSIS — I11.0 HYPERTENSIVE HEART DISEASE WITH HEART FAILURE: ICD-10-CM

## 2025-07-15 LAB
ALBUMIN SERPL-MCNC: 3.8 G/DL (ref 3.4–5)
ALBUMIN/GLOB SERPL: 1.8 RATIO
ALP SERPL-CCNC: 68 UNIT/L (ref 50–144)
ALT SERPL-CCNC: 10 UNIT/L (ref 1–45)
ANION GAP SERPL CALC-SCNC: 0 MEQ/L (ref 2–13)
AST SERPL-CCNC: 19 UNIT/L (ref 14–36)
BASOPHILS # BLD AUTO: 0.05 X10(3)/MCL (ref 0.01–0.08)
BASOPHILS NFR BLD AUTO: 0.8 % (ref 0.1–1.2)
BILIRUB SERPL-MCNC: 0.3 MG/DL (ref 0–1)
BILIRUB UR QL STRIP.AUTO: NEGATIVE
BNP BLD-MCNC: 411 PG/ML (ref 0–124.9)
BUN SERPL-MCNC: 19 MG/DL (ref 7–20)
CALCIUM SERPL-MCNC: 9.5 MG/DL (ref 8.4–10.2)
CHLORIDE SERPL-SCNC: 105 MMOL/L (ref 98–110)
CHOLEST SERPL-MCNC: 166 MG/DL (ref 0–200)
CLARITY UR: CLEAR
CO2 SERPL-SCNC: 32 MMOL/L (ref 21–32)
COLOR UR AUTO: YELLOW
CREAT SERPL-MCNC: 0.63 MG/DL (ref 0.66–1.25)
CREAT/UREA NIT SERPL: 30 (ref 12–20)
CRP SERPL-MCNC: <0.5 MG/DL
EOSINOPHIL # BLD AUTO: 0.05 X10(3)/MCL (ref 0.04–0.36)
EOSINOPHIL NFR BLD AUTO: 0.8 % (ref 0.7–7)
ERYTHROCYTE [DISTWIDTH] IN BLOOD BY AUTOMATED COUNT: 14.7 % (ref 11–14.5)
GFR SERPLBLD CREATININE-BSD FMLA CKD-EPI: 84 ML/MIN/1.73/M2
GLOBULIN SER-MCNC: 2.1 GM/DL (ref 2–3.9)
GLUCOSE SERPL-MCNC: 88 MG/DL (ref 70–115)
GLUCOSE UR QL STRIP: NEGATIVE
HCT VFR BLD AUTO: 33.6 % (ref 36–48)
HDLC SERPL-MCNC: 65 MG/DL (ref 40–60)
HGB BLD-MCNC: 10.5 G/DL (ref 11.8–16)
HGB UR QL STRIP: NEGATIVE
IMM GRANULOCYTES # BLD AUTO: 0.02 X10(3)/MCL (ref 0–0.03)
IMM GRANULOCYTES NFR BLD AUTO: 0.3 % (ref 0–0.5)
KETONES UR QL STRIP: ABNORMAL
LDLC SERPL DIRECT ASSAY-SCNC: 69.2 MG/DL (ref 30–100)
LEUKOCYTE ESTERASE UR QL STRIP: NEGATIVE
LYMPHOCYTES # BLD AUTO: 0.64 X10(3)/MCL (ref 1.16–3.74)
LYMPHOCYTES NFR BLD AUTO: 10.6 % (ref 20–55)
MCH RBC QN AUTO: 28.3 PG (ref 27–34)
MCHC RBC AUTO-ENTMCNC: 31.3 G/DL (ref 31–37)
MCV RBC AUTO: 90.6 FL (ref 79–99)
MONOCYTES # BLD AUTO: 0.35 X10(3)/MCL (ref 0.24–0.36)
MONOCYTES NFR BLD AUTO: 5.8 % (ref 4.7–12.5)
NEUTROPHILS # BLD AUTO: 4.95 X10(3)/MCL (ref 1.56–6.13)
NEUTROPHILS NFR BLD AUTO: 81.7 % (ref 37–73)
NITRITE UR QL STRIP: NEGATIVE
NRBC BLD AUTO-RTO: 0 %
PH UR STRIP: 7 [PH]
PLATELET # BLD AUTO: 323 X10(3)/MCL (ref 140–371)
PMV BLD AUTO: 9.9 FL (ref 9.4–12.4)
POTASSIUM SERPL-SCNC: 4.2 MMOL/L (ref 3.5–5.1)
PROT SERPL-MCNC: 5.9 GM/DL (ref 6.3–8.2)
PROT UR QL STRIP: NEGATIVE
RBC # BLD AUTO: 3.71 X10(6)/MCL (ref 4–5.1)
SODIUM SERPL-SCNC: 137 MMOL/L (ref 136–145)
SP GR UR STRIP.AUTO: 1.01 (ref 1–1.03)
T4 FREE SERPL-MCNC: 1.22 NG/DL (ref 0.78–2.19)
TRIGL SERPL-MCNC: 98 MG/DL (ref 30–200)
TSH SERPL-ACNC: 0.9 UIU/ML (ref 0.36–3.74)
UROBILINOGEN UR STRIP-ACNC: 1
VIT B12 SERPL-MCNC: 290 PG/ML (ref 211–946)
WBC # BLD AUTO: 6.06 X10(3)/MCL (ref 4–11.5)

## 2025-07-15 PROCEDURE — 86140 C-REACTIVE PROTEIN: CPT | Performed by: INTERNAL MEDICINE

## 2025-07-15 PROCEDURE — 80061 LIPID PANEL: CPT | Performed by: INTERNAL MEDICINE

## 2025-07-15 PROCEDURE — 83880 ASSAY OF NATRIURETIC PEPTIDE: CPT | Performed by: INTERNAL MEDICINE

## 2025-07-15 PROCEDURE — 82607 VITAMIN B-12: CPT | Performed by: INTERNAL MEDICINE

## 2025-07-15 PROCEDURE — 85025 COMPLETE CBC W/AUTO DIFF WBC: CPT | Performed by: INTERNAL MEDICINE

## 2025-07-15 PROCEDURE — 81003 URINALYSIS AUTO W/O SCOPE: CPT | Performed by: INTERNAL MEDICINE

## 2025-07-15 PROCEDURE — 84439 ASSAY OF FREE THYROXINE: CPT | Performed by: INTERNAL MEDICINE

## 2025-07-15 PROCEDURE — 84443 ASSAY THYROID STIM HORMONE: CPT | Performed by: INTERNAL MEDICINE

## 2025-07-15 PROCEDURE — 82652 VIT D 1 25-DIHYDROXY: CPT | Performed by: INTERNAL MEDICINE

## 2025-07-15 PROCEDURE — 80053 COMPREHEN METABOLIC PANEL: CPT | Performed by: INTERNAL MEDICINE

## 2025-07-18 LAB — 1,25(OH)2D SERPL-MCNC: 25 PG/ML (ref 18–78)
